# Patient Record
Sex: MALE | Race: WHITE | NOT HISPANIC OR LATINO | Employment: OTHER | URBAN - METROPOLITAN AREA
[De-identification: names, ages, dates, MRNs, and addresses within clinical notes are randomized per-mention and may not be internally consistent; named-entity substitution may affect disease eponyms.]

---

## 2020-08-01 ENCOUNTER — COMMUNICATION - HEALTHEAST (OUTPATIENT)
Dept: SCHEDULING | Facility: CLINIC | Age: 50
End: 2020-08-01

## 2021-05-26 ENCOUNTER — RECORDS - HEALTHEAST (OUTPATIENT)
Dept: ADMINISTRATIVE | Facility: CLINIC | Age: 51
End: 2021-05-26

## 2021-05-30 ENCOUNTER — HEALTH MAINTENANCE LETTER (OUTPATIENT)
Age: 51
End: 2021-05-30

## 2021-06-16 PROBLEM — R65.10 SIRS (SYSTEMIC INFLAMMATORY RESPONSE SYNDROME) (H): Status: ACTIVE | Noted: 2020-07-31

## 2021-09-19 ENCOUNTER — HEALTH MAINTENANCE LETTER (OUTPATIENT)
Age: 51
End: 2021-09-19

## 2021-10-21 ENCOUNTER — HOSPITAL ENCOUNTER (EMERGENCY)
Facility: CLINIC | Age: 51
Discharge: HOSPICE | End: 2021-10-22
Attending: FAMILY MEDICINE | Admitting: FAMILY MEDICINE
Payer: COMMERCIAL

## 2021-10-21 ENCOUNTER — APPOINTMENT (OUTPATIENT)
Dept: CT IMAGING | Facility: CLINIC | Age: 51
End: 2021-10-21
Payer: COMMERCIAL

## 2021-10-21 DIAGNOSIS — K83.09 CHOLANGITIS (H): ICD-10-CM

## 2021-10-21 DIAGNOSIS — K83.01 PSC (PRIMARY SCLEROSING CHOLANGITIS) (H): Chronic | ICD-10-CM

## 2021-10-21 DIAGNOSIS — K83.09 ASCENDING CHOLANGITIS (H): ICD-10-CM

## 2021-10-21 PROBLEM — R11.2 NAUSEA & VOMITING: Status: ACTIVE | Noted: 2020-01-07

## 2021-10-21 PROBLEM — R74.01 ELEVATED TRANSAMINASE LEVEL: Status: ACTIVE | Noted: 2020-01-07

## 2021-10-21 LAB
ALBUMIN SERPL-MCNC: 3.6 G/DL (ref 3.5–5)
ALP SERPL-CCNC: 381 U/L (ref 45–120)
ALT SERPL W P-5'-P-CCNC: 86 U/L (ref 0–45)
ANION GAP SERPL CALCULATED.3IONS-SCNC: 9 MMOL/L (ref 5–18)
APTT PPP: 31 SECONDS (ref 22–38)
AST SERPL W P-5'-P-CCNC: 70 U/L (ref 0–40)
BASOPHILS # BLD AUTO: 0.1 10E3/UL (ref 0–0.2)
BASOPHILS NFR BLD AUTO: 1 %
BILIRUB SERPL-MCNC: 1.6 MG/DL (ref 0–1)
BUN SERPL-MCNC: 10 MG/DL (ref 8–22)
CALCIUM SERPL-MCNC: 10.2 MG/DL (ref 8.5–10.5)
CHLORIDE BLD-SCNC: 101 MMOL/L (ref 98–107)
CO2 SERPL-SCNC: 27 MMOL/L (ref 22–31)
CREAT SERPL-MCNC: 1.03 MG/DL (ref 0.7–1.3)
EOSINOPHIL # BLD AUTO: 0.1 10E3/UL (ref 0–0.7)
EOSINOPHIL NFR BLD AUTO: 1 %
ERYTHROCYTE [DISTWIDTH] IN BLOOD BY AUTOMATED COUNT: 12.1 % (ref 10–15)
GFR SERPL CREATININE-BSD FRML MDRD: 84 ML/MIN/1.73M2
GLUCOSE BLD-MCNC: 105 MG/DL (ref 70–125)
HCT VFR BLD AUTO: 47.7 % (ref 40–53)
HGB BLD-MCNC: 15.3 G/DL (ref 13.3–17.7)
HOLD SPECIMEN: NORMAL
IMM GRANULOCYTES # BLD: 0 10E3/UL
IMM GRANULOCYTES NFR BLD: 0 %
INR PPP: 1 (ref 0.85–1.15)
LACTATE SERPL-SCNC: 1.3 MMOL/L (ref 0.7–2)
LIPASE SERPL-CCNC: 24 U/L (ref 0–52)
LYMPHOCYTES # BLD AUTO: 2.1 10E3/UL (ref 0.8–5.3)
LYMPHOCYTES NFR BLD AUTO: 17 %
MCH RBC QN AUTO: 28.9 PG (ref 26.5–33)
MCHC RBC AUTO-ENTMCNC: 32.1 G/DL (ref 31.5–36.5)
MCV RBC AUTO: 90 FL (ref 78–100)
MONOCYTES # BLD AUTO: 1 10E3/UL (ref 0–1.3)
MONOCYTES NFR BLD AUTO: 8 %
NEUTROPHILS # BLD AUTO: 9 10E3/UL (ref 1.6–8.3)
NEUTROPHILS NFR BLD AUTO: 73 %
NRBC # BLD AUTO: 0 10E3/UL
NRBC BLD AUTO-RTO: 0 /100
PLATELET # BLD AUTO: 398 10E3/UL (ref 150–450)
POTASSIUM BLD-SCNC: 4.1 MMOL/L (ref 3.5–5)
PROT SERPL-MCNC: 8.8 G/DL (ref 6–8)
RBC # BLD AUTO: 5.3 10E6/UL (ref 4.4–5.9)
SARS-COV-2 RNA RESP QL NAA+PROBE: NEGATIVE
SODIUM SERPL-SCNC: 137 MMOL/L (ref 136–145)
TROPONIN I SERPL-MCNC: 0.02 NG/ML (ref 0–0.29)
WBC # BLD AUTO: 12.3 10E3/UL (ref 4–11)

## 2021-10-21 PROCEDURE — 96375 TX/PRO/DX INJ NEW DRUG ADDON: CPT

## 2021-10-21 PROCEDURE — 85610 PROTHROMBIN TIME: CPT | Performed by: PHYSICIAN ASSISTANT

## 2021-10-21 PROCEDURE — 250N000011 HC RX IP 250 OP 636: Performed by: PHYSICIAN ASSISTANT

## 2021-10-21 PROCEDURE — 99285 EMERGENCY DEPT VISIT HI MDM: CPT | Mod: 25

## 2021-10-21 PROCEDURE — 83605 ASSAY OF LACTIC ACID: CPT | Performed by: PHYSICIAN ASSISTANT

## 2021-10-21 PROCEDURE — 74177 CT ABD & PELVIS W/CONTRAST: CPT

## 2021-10-21 PROCEDURE — 96376 TX/PRO/DX INJ SAME DRUG ADON: CPT

## 2021-10-21 PROCEDURE — 93005 ELECTROCARDIOGRAM TRACING: CPT | Performed by: EMERGENCY MEDICINE

## 2021-10-21 PROCEDURE — 82040 ASSAY OF SERUM ALBUMIN: CPT | Performed by: PHYSICIAN ASSISTANT

## 2021-10-21 PROCEDURE — 71275 CT ANGIOGRAPHY CHEST: CPT

## 2021-10-21 PROCEDURE — 96361 HYDRATE IV INFUSION ADD-ON: CPT

## 2021-10-21 PROCEDURE — C9803 HOPD COVID-19 SPEC COLLECT: HCPCS

## 2021-10-21 PROCEDURE — 85730 THROMBOPLASTIN TIME PARTIAL: CPT | Performed by: PHYSICIAN ASSISTANT

## 2021-10-21 PROCEDURE — 87635 SARS-COV-2 COVID-19 AMP PRB: CPT | Performed by: PHYSICIAN ASSISTANT

## 2021-10-21 PROCEDURE — 85004 AUTOMATED DIFF WBC COUNT: CPT | Performed by: PHYSICIAN ASSISTANT

## 2021-10-21 PROCEDURE — 87040 BLOOD CULTURE FOR BACTERIA: CPT | Mod: XS | Performed by: PHYSICIAN ASSISTANT

## 2021-10-21 PROCEDURE — 258N000003 HC RX IP 258 OP 636: Performed by: PHYSICIAN ASSISTANT

## 2021-10-21 PROCEDURE — 84484 ASSAY OF TROPONIN QUANT: CPT | Performed by: PHYSICIAN ASSISTANT

## 2021-10-21 PROCEDURE — 83690 ASSAY OF LIPASE: CPT | Performed by: PHYSICIAN ASSISTANT

## 2021-10-21 PROCEDURE — 96365 THER/PROPH/DIAG IV INF INIT: CPT | Mod: 59

## 2021-10-21 PROCEDURE — 36415 COLL VENOUS BLD VENIPUNCTURE: CPT | Performed by: PHYSICIAN ASSISTANT

## 2021-10-21 RX ORDER — IOPAMIDOL 755 MG/ML
100 INJECTION, SOLUTION INTRAVASCULAR ONCE
Status: COMPLETED | OUTPATIENT
Start: 2021-10-21 | End: 2021-10-21

## 2021-10-21 RX ORDER — IBUPROFEN 200 MG
600 TABLET ORAL EVERY 6 HOURS PRN
COMMUNITY

## 2021-10-21 RX ORDER — ONDANSETRON 2 MG/ML
4 INJECTION INTRAMUSCULAR; INTRAVENOUS ONCE
Status: COMPLETED | OUTPATIENT
Start: 2021-10-21 | End: 2021-10-21

## 2021-10-21 RX ORDER — PIPERACILLIN SODIUM, TAZOBACTAM SODIUM 3; .375 G/15ML; G/15ML
3.38 INJECTION, POWDER, LYOPHILIZED, FOR SOLUTION INTRAVENOUS EVERY 8 HOURS
Status: DISCONTINUED | OUTPATIENT
Start: 2021-10-22 | End: 2021-10-22 | Stop reason: HOSPADM

## 2021-10-21 RX ORDER — PIPERACILLIN SODIUM, TAZOBACTAM SODIUM 3; .375 G/15ML; G/15ML
3.38 INJECTION, POWDER, LYOPHILIZED, FOR SOLUTION INTRAVENOUS ONCE
Status: COMPLETED | OUTPATIENT
Start: 2021-10-21 | End: 2021-10-21

## 2021-10-21 RX ORDER — KETOROLAC TROMETHAMINE 30 MG/ML
30 INJECTION, SOLUTION INTRAMUSCULAR; INTRAVENOUS ONCE
Status: COMPLETED | OUTPATIENT
Start: 2021-10-21 | End: 2021-10-21

## 2021-10-21 RX ADMIN — KETOROLAC TROMETHAMINE 30 MG: 30 INJECTION, SOLUTION INTRAMUSCULAR; INTRAVENOUS at 21:49

## 2021-10-21 RX ADMIN — PIPERACILLIN AND TAZOBACTAM 3.38 G: 3; .375 INJECTION, POWDER, LYOPHILIZED, FOR SOLUTION INTRAVENOUS at 22:24

## 2021-10-21 RX ADMIN — ONDANSETRON 4 MG: 2 INJECTION INTRAMUSCULAR; INTRAVENOUS at 21:11

## 2021-10-21 RX ADMIN — HYDROMORPHONE HYDROCHLORIDE 1 MG: 1 INJECTION, SOLUTION INTRAMUSCULAR; INTRAVENOUS; SUBCUTANEOUS at 21:12

## 2021-10-21 RX ADMIN — IOPAMIDOL 100 ML: 755 INJECTION, SOLUTION INTRAVENOUS at 22:09

## 2021-10-21 RX ADMIN — SODIUM CHLORIDE 1000 ML: 9 INJECTION, SOLUTION INTRAVENOUS at 21:11

## 2021-10-21 ASSESSMENT — ENCOUNTER SYMPTOMS
CHILLS: 1
FATIGUE: 1
ABDOMINAL PAIN: 1
SHORTNESS OF BREATH: 1

## 2021-10-22 VITALS
OXYGEN SATURATION: 96 % | TEMPERATURE: 98.8 F | DIASTOLIC BLOOD PRESSURE: 72 MMHG | WEIGHT: 210 LBS | RESPIRATION RATE: 22 BRPM | SYSTOLIC BLOOD PRESSURE: 143 MMHG | HEART RATE: 87 BPM | BODY MASS INDEX: 26.96 KG/M2

## 2021-10-22 PROCEDURE — 250N000011 HC RX IP 250 OP 636: Performed by: FAMILY MEDICINE

## 2021-10-22 RX ORDER — HYDROMORPHONE HYDROCHLORIDE 1 MG/ML
0.5 INJECTION, SOLUTION INTRAMUSCULAR; INTRAVENOUS; SUBCUTANEOUS
Status: DISCONTINUED | OUTPATIENT
Start: 2021-10-22 | End: 2021-10-22 | Stop reason: HOSPADM

## 2021-10-22 RX ADMIN — HYDROMORPHONE HYDROCHLORIDE 0.5 MG: 1 INJECTION, SOLUTION INTRAMUSCULAR; INTRAVENOUS; SUBCUTANEOUS at 00:37

## 2021-10-22 RX ADMIN — HYDROMORPHONE HYDROCHLORIDE 0.5 MG: 1 INJECTION, SOLUTION INTRAMUSCULAR; INTRAVENOUS; SUBCUTANEOUS at 02:33

## 2021-10-22 NOTE — ED PROVIDER NOTES
ED CONSULTATION  Date/Time:10/21/2021 10:33 PM    I am seeing this patient along with KIMMY Hernandez.  I, Jeremie Mir M.D. have reviewed the documentation, personally taken the patient's history, performed an exam and agree with the physical finds, diagnosis and management plan.    DIAGNOSIS:  1. Cholangitis    2. Ascending cholangitis    3. PSC (primary sclerosing cholangitis)        10:34 PM Patient was staffed with me from Naveen Malone PA-C.  11:03 PM  Prior records were reviewed.  I personally performed history and physical.  I personally reviewed lab, EKG, and radiology results as indicated.  Care was discussed with mid-level provider.  Diagnosis and disposition were discussed.  Final disposition will be per the [facility] depending diagnostic studies and patient's clinical trajectory.  12:46 AM  Transfer to [facility].    New Prescriptions    No medications on file     HPI: 51-year-old male with history of primary sclerosing cholangitis, pulmonary embolism who comes in with abdominal pain and sensation of biliary obstruction that he has had in the past.  Physical Exam:/82   Pulse 92   Temp 100.3  F (37.9  C) (Oral)   Resp 22   Wt 95.3 kg (210 lb)   SpO2 94%   BMI 26.96 kg/m    Physical Exam  Vitals and nursing note reviewed.   Constitutional:       Appearance: Normal appearance.   HENT:      Head: Normocephalic and atraumatic.      Right Ear: External ear normal.      Left Ear: External ear normal.      Nose: Nose normal.   Eyes:      Extraocular Movements: Extraocular movements intact.      Conjunctiva/sclera: Conjunctivae normal.      Pupils: Pupils are equal, round, and reactive to light.   Pulmonary:      Effort: Pulmonary effort is normal.   Musculoskeletal:         General: No swelling or deformity. Normal range of motion.      Cervical back: Normal range of motion.   Neurological:      General: No focal deficit present.      Mental Status: He is alert and oriented to person, place, and  time. Mental status is at baseline.   Psychiatric:         Mood and Affect: Mood normal.         Behavior: Behavior normal.         Thought Content: Thought content normal.         Results for orders placed or performed during the hospital encounter of 10/21/21   CT Chest Pulmonary Embolism w Contrast    Impression    IMPRESSION:  1.  No etiology for symptoms evident. Negative CTA.   CT Abdomen Pelvis w Contrast    Impression    IMPRESSION:   1.  No inflammatory change, bowel obstruction or abscess.  2.  Mild retroperitoneal adenopathy unchanged.  3.  Mild biliary dilatation and extra hepatic bile duct thickening similar to prior.   Extra Blue Top Tube   Result Value Ref Range    Hold Specimen JIC    Extra Red Top Tube   Result Value Ref Range    Hold Specimen JIC    Extra Green Top (Lithium Heparin) Tube   Result Value Ref Range    Hold Specimen JIC    Extra Purple Top Tube   Result Value Ref Range    Hold Specimen JIC    Result Value Ref Range    INR 1.00 0.85 - 1.15   Partial thromboplastin time   Result Value Ref Range    aPTT 31 22 - 38 Seconds   Comprehensive metabolic panel   Result Value Ref Range    Sodium 137 136 - 145 mmol/L    Potassium 4.1 3.5 - 5.0 mmol/L    Chloride 101 98 - 107 mmol/L    Carbon Dioxide (CO2) 27 22 - 31 mmol/L    Anion Gap 9 5 - 18 mmol/L    Urea Nitrogen 10 8 - 22 mg/dL    Creatinine 1.03 0.70 - 1.30 mg/dL    Calcium 10.2 8.5 - 10.5 mg/dL    Glucose 105 70 - 125 mg/dL    Alkaline Phosphatase 381 (H) 45 - 120 U/L    AST 70 (H) 0 - 40 U/L    ALT 86 (H) 0 - 45 U/L    Protein Total 8.8 (H) 6.0 - 8.0 g/dL    Albumin 3.6 3.5 - 5.0 g/dL    Bilirubin Total 1.6 (H) 0.0 - 1.0 mg/dL    GFR Estimate 84 >60 mL/min/1.73m2   Lactic acid whole blood   Result Value Ref Range    Lactic Acid 1.3 0.7 - 2.0 mmol/L   Result Value Ref Range    Troponin I 0.02 0.00 - 0.29 ng/mL   Result Value Ref Range    Lipase 24 0 - 52 U/L   CBC with platelets and differential   Result Value Ref Range    WBC Count 12.3  (H) 4.0 - 11.0 10e3/uL    RBC Count 5.30 4.40 - 5.90 10e6/uL    Hemoglobin 15.3 13.3 - 17.7 g/dL    Hematocrit 47.7 40.0 - 53.0 %    MCV 90 78 - 100 fL    MCH 28.9 26.5 - 33.0 pg    MCHC 32.1 31.5 - 36.5 g/dL    RDW 12.1 10.0 - 15.0 %    Platelet Count 398 150 - 450 10e3/uL    % Neutrophils 73 %    % Lymphocytes 17 %    % Monocytes 8 %    % Eosinophils 1 %    % Basophils 1 %    % Immature Granulocytes 0 %    NRBCs per 100 WBC 0 <1 /100    Absolute Neutrophils 9.0 (H) 1.6 - 8.3 10e3/uL    Absolute Lymphocytes 2.1 0.8 - 5.3 10e3/uL    Absolute Monocytes 1.0 0.0 - 1.3 10e3/uL    Absolute Eosinophils 0.1 0.0 - 0.7 10e3/uL    Absolute Basophils 0.1 0.0 - 0.2 10e3/uL    Absolute Immature Granulocytes 0.0 <=0.0 10e3/uL    Absolute NRBCs 0.0 10e3/uL   Asymptomatic COVID-19 Virus (Coronavirus) by PCR Nasopharyngeal    Specimen: Nasopharyngeal; Swab   Result Value Ref Range    SARS CoV2 PCR Negative Negative       Labs Ordered and Resulted from Time of ED Arrival Up to the Time of Departure from the ED   COMPREHENSIVE METABOLIC PANEL - Abnormal; Notable for the following components:       Result Value    Alkaline Phosphatase 381 (*)     AST 70 (*)     ALT 86 (*)     Protein Total 8.8 (*)     Bilirubin Total 1.6 (*)     All other components within normal limits   CBC WITH PLATELETS AND DIFFERENTIAL - Abnormal; Notable for the following components:    WBC Count 12.3 (*)     Absolute Neutrophils 9.0 (*)     All other components within normal limits   INR - Normal   PARTIAL THROMBOPLASTIN TIME - Normal   LACTIC ACID WHOLE BLOOD - Normal   TROPONIN I - Normal   LIPASE - Normal   COVID-19 VIRUS (CORONAVIRUS) BY PCR - Normal    Narrative:     Testing was performed using the florin  SARS-CoV-2 & Influenza A/B Assay on the florin  [employer]  System.  This test should be ordered for the detection of SARS-COV-2 in individuals who meet SARS-CoV-2 clinical and/or epidemiological criteria. Test performance is unknown in asymptomatic patients.   This test is for in vitro diagnostic use under the FDA EUA for laboratories certified under CLIA to perform moderate and/or high complexity testing. This test has not been FDA cleared or approved.  A negative test does not rule out the presence of PCR inhibitors in the specimen or target RNA in concentration below the limit of detection for the assay. The possibility of a false negative should be considered if the patient's recent exposure or clinical presentation suggests COVID-19.  [facility] are certified under the Clinical Laboratory Improvement Amendments of 1988 (CLIA-88) as qualified to perform moderate and/or high complexity laboratory testing.   EXTRA BLUE TOP TUBE   EXTRA RED TOP TUBE   EXTRA GREEN TOP (LITHIUM HEPARIN) TUBE   EXTRA PURPLE TOP TUBE   MAY SALINE LOCK IV   CALL   BLOOD CULTURE   BLOOD CULTURE   EXTRA TUBE    Narrative:     The following orders were created for panel order Center Draw.  Procedure                               Abnormality         Status                     ---------                               -----------         ------                     Extra Blue Top Tube[[id number]]                              Final result               Extra Red Top Tube[[id number]]                               Final result               Extra Green Top (Lithium...[[id number]]                      Final result               Extra Purple Top Tube[[id number]]                            Final result                 Please view results for these tests on the individual orders.   CBC WITH PLATELETS & DIFFERENTIAL    Narrative:     The following orders were created for panel order CBC with platelets differential.  Procedure                               Abnormality         Status                     ---------                               -----------         ------                     CBC with platelets and d...[[id number]]  Abnormal            Final result                 Please view  results for these tests on the individual orders.              Jeremie Mir MD  10/22/21 0051

## 2021-10-22 NOTE — ED TRIAGE NOTES
"This morning left side chest pain began that radiates to shoulder. Also reports shortness of breath and chills this evening. Patient reports a history of primary sclerosing cholangitis and yesterday was having pain \"like something was sitting in my bile duct.\" Also history colon cancer.   "

## 2021-10-22 NOTE — PHARMACY-ADMISSION MEDICATION HISTORY
Pharmacy Note - Admission Medication History    Pertinent Provider Information: None   ______________________________________________________________________    Prior To Admission (PTA) med list completed and updated in EMR.       PTA Med List   Medication Sig Last Dose     ibuprofen (ADVIL/MOTRIN) 200 MG tablet Take 600 mg by mouth every 6 hours as needed for mild pain 10/21/2021 at x 1       Information source(s): Patient    Method of interview communication: in-person    Patient was asked about OTC/herbal products specifically.  PTA med list reflects this.    Based on the pharmacist's assessment, the PTA med list information appears reliable    Allergies were reviewed, assessed, and updated with the patient.      Patient does not use any multi-dose medications prior to admission.     Thank you for the opportunity to participate in the care of this patient.      Loki Quezada RPH     10/21/2021     9:06 PM

## 2021-10-22 NOTE — ED PROVIDER NOTES
EMERGENCY DEPARTMENT ENCOUNTER      NAME: Jonna Dumont  AGE: 51 year old male  YOB: 1970  MRN: [id number]  EVALUATION DATE & TIME: 10/21/2021  8:28 PM    PCP: No Ref-Primary, Physician    ED PROVIDER: Naveen Malone PA-C      Chief Complaint   Patient presents with     Chest Pain     Shortness of Breath         FINAL IMPRESSION:  1. Cholangitis    2. Ascending cholangitis    3. PSC (primary sclerosing cholangitis)          MEDICAL DECISION MAKING:    Pertinent Labs & Imaging studies reviewed. (See chart for details)  51 year old male presents to the Emergency Department for evaluation of abdominal pain, fever with history of primary sclerosing cholangitis.    After obtaining full history of present illness, reviewing vitals and examining the patient decided to image his chest and abdomen based on his previous history of pulmonary emboli and his chief complaints of abdominal pain, fever, and then pain radiating up into the chest.    Laboratory results did come back concerning for obstructive pathology with elevated alk phos as well as total bilirubin.  White blood cell count also elevated.  CT of the chest is negative for PE, no infectious findings on CT scan of the chest.  CT of the abdomen does show mild biliary dilatation and extrahepatic bile duct thickening but very similar compared to prior CT.  There is no inflammatory change or bowel obstruction or abscess noted.    At this point time patient has criteria for acute cholangitis with fever, belly pain, abnormal LFTs.  He has been treated with IV fluids, pain medication, nausea medicine, IV Zosyn and blood cultures.  Patient remained stable.  We did attempt to look for a bed admitting the patient to [facility] which was his first request, they are closed.  We will attempt the [facility] next.    I did discuss the case with our on-call gastroenterologist and he does agree with the management at this time and does endorse that the patient will  likely need an ERCP sometime in the near future.  Unfortunately her woodwinds we cannot perform ERCPs.    Discuss transfer with the patient down to [facility], he agrees.  We will transfer the patient by ambulance down to this facility.    ED COURSE    8:38 PM I met with the patient, obtained history, performed an initial exam, and discussed options and plan for diagnostics and treatment here in the ED.  10:39 PM Staffed patient with Dr. Jeremie Mir. Spoke with him about the patient's history and plan for diagnostics and treatment.   10:48 PM Spoke with Dr. Banda of [facility], GI.   11:04 PM Spoke with Hospital intake line of [facility]. They will be discussing the case and looking to see if they have an appropriate bed.  12:46 AM Spoke with [place] bed placement about transfer to [facility] through the [facility], Dr. Brennan will be accepting hospitalist.    At the conclusion of the encounter I discussed the results of all of the tests and the disposition. The questions were answered. The patient or family acknowledged understanding and was agreeable with the care plan.     MEDICATIONS GIVEN IN THE EMERGENCY:  Medications   piperacillin-tazobactam (ZOSYN) 3.375 g vial to attach to  mL bag (has no administration in time range)   HYDROmorphone (PF) (DILAUDID) injection 0.5 mg (0.5 mg Intravenous Given 10/22/21 0037)   0.9% sodium chloride BOLUS (1,000 mLs Intravenous New Bag 10/21/21 2111)   HYDROmorphone (DILAUDID) injection 1 mg (1 mg Intravenous Given 10/21/21 2112)   ondansetron (ZOFRAN) injection 4 mg (4 mg Intravenous Given 10/21/21 2111)   ketorolac (TORADOL) injection 30 mg (30 mg Intravenous Given 10/21/21 2149)   piperacillin-tazobactam (ZOSYN) 3.375 g vial to attach to  mL bag (0 g Intravenous Stopped 10/21/21 2254)   iopamidol (ISOVUE-370) solution 100 mL (100 mLs Intravenous Given 10/21/21 2209)       NEW PRESCRIPTIONS STARTED AT TODAY'S ER VISIT  New  Prescriptions    No medications on file            =================================================================    HPI    Patient information was obtained from: patient and patient's wife    Use of Interpretor: N/A       Jonna Dumont is a 51 year old male with a pertinent history of primary sclerosing cholangitis, ulcerative colitis, pulmonary embolism, colon cancer, and Factor V Leiden who presents to this ED by walk in for evaluation of abdominal pain.    Per chart review, on 2/12/2021, patient was seen at [facility] for recent onset of mild abdominal pain and pruritis and fever. CT abdomen demonstrated what appeared to be chronic changes with no acute findings to explain his right upper quadrant pain.  Had mild leukocytosis and was treated with oral antibiotics (cefuroxime/metronidazole) and sent home. Persistent symptoms prompted him to present to [facility] on 2/13/2021 where right upper quadrant ultrasound showed no acute findings, no evidence of cholecystitis or cholangitis on imaging. Given his fever, leukocytosis and worsening pain, he was empirically treated with Zosyn for presumed descending cholangitis. He was transferred to [facility] for further assessment. Zosyn was continued and GI and Hepatobiliary surgery consulted. He improved and was transitioned to oral antibiotics. He is able to eat and drink without difficulty. He had a follow up scheduled with [facility] on 2/23/2021 for routine surveillance MRCP.    Yesterday, patient felt like there was something sitting in his common bile duct. This morning, patient felt lower left quadrant abdominal pain which is odd because he usually feels abdominal pain on the right upper quadrant. Reports that pain radiates up his left side and into his shoulder and chest with some associated shortness of breath. At 12:00 PM (~9 hours ago) patient took Ibuprofen which wore off around 5:00 PM (~4 hours ago). At this point, the pain  "returned as well as onset of chills. Patient took a nap by his fireplace and a very hot shower, but chills persisted. Also notes that his hands were cold all day. After getting dressed following his shower, patient reports feeling very fatigued. At this point, he decided to present to the ED. Patient reports he wanted to go to [facility] where he was admitted last time, however, the pain was too intense to drive that far to be seen.     Patient reports he was previously on an anticoagulant for a long time for his history of PE and Factor V Leiden, however, is no longer taking it per the recommendation of a specialist at [facility]. Notes that current shortness of breath does not feel as bad as when he had his pulmonary embolus. Patient is unsure if he has had any abnormal stool since he has a J pouch and reports stool is never \"normal\". He reports having his last ERCP over a year ago. Patient is fully vaccinated for COVID-19. He denies any recent known COVID-19 exposures. Patient denies any other complaints at this time.      REVIEW OF SYSTEMS   Review of Systems   Constitutional: Positive for chills and fatigue.        Positive for cold hands.   Respiratory: Positive for shortness of breath.    Cardiovascular: Positive for chest pain (left side).   Gastrointestinal: Positive for abdominal pain (LLQ).   Musculoskeletal:        Positive for left shoulder and left side pain.   All other systems reviewed and are negative.         PAST MEDICAL HISTORY:  Past Medical History:   Diagnosis Date     Colon cancer (H)      Factor V Leiden mutation (H)      Pulmonary embolism (H)      Ulcerative colitis        PAST SURGICAL HISTORY:  Past Surgical History:   Procedure Laterality Date     BACK SURGERY      lumbar discectomy     COLON SURGERY       ENDOSCOPIC RETROGRADE CHOLANGIOPANCREATOGRAM  5/23/2016    Procedure: ENDOSCOPIC RETROGRADE CHOLANGIOPANCREATOGRAPHY WITH BALOON DILATION BILE DUCT STRICTURE WITH BILIARY " BRUSHINGS WITH SLUDGE REMOVAL;  Surgeon: Michael Jiménez MD;  Location: [facility] OR;  Service:      PROCTOSCOPY  5/3/2011    Procedure:PROCTOSCOPY; Rectal Exam Under Anesthesia , with Smith scope, Anoscopy, Pouchoscopy with biopsies Flexible sigmoidoscopy with biopsies; Surgeon:SAMEER LOPEZ; Location:[facility] OR     SIGMOIDOSCOPY FLEXIBLE  5/3/2011    Procedure:SIGMOIDOSCOPY FLEXIBLE; for Pouchoscopy with biopsies ; Surgeon:SAMEER LOPEZ; Location:[facility] OR         CURRENT MEDICATIONS:      Current Facility-Administered Medications:      HYDROmorphone (PF) (DILAUDID) injection 0.5 mg, 0.5 mg, Intravenous, Q2H PRN, Jeremie Mir MD, 0.5 mg at 10/22/21 0037     piperacillin-tazobactam (ZOSYN) 3.375 g vial to attach to  mL bag, 3.375 g, Intravenous, Q8H, Naveen Malone PA-C    Current Outpatient Medications:      ibuprofen (ADVIL/MOTRIN) 200 MG tablet, Take 600 mg by mouth every 6 hours as needed for mild pain, Disp: , Rfl:       ALLERGIES:  Allergies   Allergen Reactions     Azithromycin Shortness Of Breath       FAMILY HISTORY:  No family history on file.    SOCIAL HISTORY:   Social History     Socioeconomic History     Marital status:      Spouse name: Not on file     Number of children: Not on file     Years of education: Not on file     Highest education level: Not on file   Occupational History     Not on file   Tobacco Use     Smoking status: Current Every Day Smoker     Packs/day: 0.25     Years: 4.00     Pack years: 1.00     Types: Cigarettes, Cigarettes   Substance and Sexual Activity     Alcohol use: No     Comment: rare     Drug use: No     Sexual activity: Not on file   Other Topics Concern     Parent/sibling w/ CABG, MI or angioplasty before 65F 55M? Not Asked   Social History Narrative     Not on file     Social Determinants of Health     Financial Resource Strain:      Difficulty of Paying Living Expenses:    Food Insecurity:      Worried About Running Out of Food in the  Last Year:      Ran Out of Food in the Last Year:    Transportation Needs:      Lack of Transportation (Medical):      Lack of Transportation (Non-Medical):    Physical Activity:      Days of Exercise per Week:      Minutes of Exercise per Session:    Stress:      Feeling of Stress :    Social Connections:      Frequency of Communication with Friends and Family:      Frequency of Social Gatherings with Friends and Family:      Attends Jewish Services:      Active Member of Clubs or Organizations:      Attends Club or Organization Meetings:      Marital Status:    Intimate Partner Violence:      Fear of Current or Ex-Partner:      Emotionally Abused:      Physically Abused:      Sexually Abused:        VITALS:  Patient Vitals for the past 24 hrs:   BP Temp Temp src Pulse Resp SpO2 Weight   10/22/21 0030 115/82 -- -- 92 22 94 % --   10/22/21 0000 124/62 -- -- 93 17 92 % --   10/21/21 2330 117/69 -- -- 98 15 93 % --   10/21/21 2300 (!) 146/75 -- -- 107 20 93 % --   10/21/21 2245 -- 100.3  F (37.9  C) Oral -- -- -- --   10/21/21 2129 -- -- -- 102 25 95 % --   10/21/21 2026 132/74 (!) 101.7  F (38.7  C) Oral 120 20 97 % 95.3 kg (210 lb)       PHYSICAL EXAM    Physical Exam  Vitals and nursing note reviewed.   Constitutional:       General: He is not in acute distress.     Appearance: Normal appearance. He is not ill-appearing or toxic-appearing.   HENT:      Head: Normocephalic and atraumatic.      Right Ear: External ear normal.      Left Ear: Tympanic membrane and external ear normal.      Nose: Nose normal.      Mouth/Throat:      Pharynx: No posterior oropharyngeal erythema.   Eyes:      General: No scleral icterus.        Right eye: No discharge.         Left eye: No discharge.      Extraocular Movements: Extraocular movements intact.      Conjunctiva/sclera: Conjunctivae normal.   Cardiovascular:      Rate and Rhythm: Normal rate and regular rhythm.      Pulses: Normal pulses.      Heart sounds: Normal heart  sounds. No murmur heard.     Pulmonary:      Effort: Pulmonary effort is normal. No respiratory distress.      Breath sounds: Normal breath sounds.   Abdominal:      General: Abdomen is flat. Bowel sounds are normal.      Comments: Patient has reproducible tenderness to touch primarily in the left side of the abdomen.  No obvious distention.   Musculoskeletal:         General: No swelling, tenderness, deformity or signs of injury. Normal range of motion.      Cervical back: Normal range of motion and neck supple.   Skin:     General: Skin is warm and dry.      Coloration: Skin is not jaundiced.      Findings: No bruising, erythema or rash.   Neurological:      General: No focal deficit present.      Mental Status: He is alert and oriented to person, place, and time. Mental status is at baseline.      Sensory: No sensory deficit.      Motor: No weakness.   Psychiatric:         Mood and Affect: Mood normal.         Behavior: Behavior normal.         Judgment: Judgment normal.          LAB:  All pertinent labs reviewed and interpreted.  Results for orders placed or performed during the hospital encounter of 10/21/21   CT Chest Pulmonary Embolism w Contrast    Impression    IMPRESSION:  1.  No etiology for symptoms evident. Negative CTA.   CT Abdomen Pelvis w Contrast    Impression    IMPRESSION:   1.  No inflammatory change, bowel obstruction or abscess.  2.  Mild retroperitoneal adenopathy unchanged.  3.  Mild biliary dilatation and extra hepatic bile duct thickening similar to prior.   Extra Blue Top Tube   Result Value Ref Range    Hold Specimen JIC    Extra Red Top Tube   Result Value Ref Range    Hold Specimen JIC    Extra Green Top (Lithium Heparin) Tube   Result Value Ref Range    Hold Specimen JIC    Extra Purple Top Tube   Result Value Ref Range    Hold Specimen JIC    Result Value Ref Range    INR 1.00 0.85 - 1.15   Partial thromboplastin time   Result Value Ref Range    aPTT 31 22 - 38 Seconds    Comprehensive metabolic panel   Result Value Ref Range    Sodium 137 136 - 145 mmol/L    Potassium 4.1 3.5 - 5.0 mmol/L    Chloride 101 98 - 107 mmol/L    Carbon Dioxide (CO2) 27 22 - 31 mmol/L    Anion Gap 9 5 - 18 mmol/L    Urea Nitrogen 10 8 - 22 mg/dL    Creatinine 1.03 0.70 - 1.30 mg/dL    Calcium 10.2 8.5 - 10.5 mg/dL    Glucose 105 70 - 125 mg/dL    Alkaline Phosphatase 381 (H) 45 - 120 U/L    AST 70 (H) 0 - 40 U/L    ALT 86 (H) 0 - 45 U/L    Protein Total 8.8 (H) 6.0 - 8.0 g/dL    Albumin 3.6 3.5 - 5.0 g/dL    Bilirubin Total 1.6 (H) 0.0 - 1.0 mg/dL    GFR Estimate 84 >60 mL/min/1.73m2   Lactic acid whole blood   Result Value Ref Range    Lactic Acid 1.3 0.7 - 2.0 mmol/L   Result Value Ref Range    Troponin I 0.02 0.00 - 0.29 ng/mL   Result Value Ref Range    Lipase 24 0 - 52 U/L   CBC with platelets and differential   Result Value Ref Range    WBC Count 12.3 (H) 4.0 - 11.0 10e3/uL    RBC Count 5.30 4.40 - 5.90 10e6/uL    Hemoglobin 15.3 13.3 - 17.7 g/dL    Hematocrit 47.7 40.0 - 53.0 %    MCV 90 78 - 100 fL    MCH 28.9 26.5 - 33.0 pg    MCHC 32.1 31.5 - 36.5 g/dL    RDW 12.1 10.0 - 15.0 %    Platelet Count 398 150 - 450 10e3/uL    % Neutrophils 73 %    % Lymphocytes 17 %    % Monocytes 8 %    % Eosinophils 1 %    % Basophils 1 %    % Immature Granulocytes 0 %    NRBCs per 100 WBC 0 <1 /100    Absolute Neutrophils 9.0 (H) 1.6 - 8.3 10e3/uL    Absolute Lymphocytes 2.1 0.8 - 5.3 10e3/uL    Absolute Monocytes 1.0 0.0 - 1.3 10e3/uL    Absolute Eosinophils 0.1 0.0 - 0.7 10e3/uL    Absolute Basophils 0.1 0.0 - 0.2 10e3/uL    Absolute Immature Granulocytes 0.0 <=0.0 10e3/uL    Absolute NRBCs 0.0 10e3/uL   Asymptomatic COVID-19 Virus (Coronavirus) by PCR Nasopharyngeal    Specimen: Nasopharyngeal; Swab   Result Value Ref Range    SARS CoV2 PCR Negative Negative       RADIOLOGY:  Reviewed all pertinent imaging. Please see official radiology report.  CT Abdomen Pelvis w Contrast   Final Result   IMPRESSION:    1.   No inflammatory change, bowel obstruction or abscess.   2.  Mild retroperitoneal adenopathy unchanged.   3.  Mild biliary dilatation and extra hepatic bile duct thickening similar to prior.      CT Chest Pulmonary Embolism w Contrast   Final Result   IMPRESSION:   1.  No etiology for symptoms evident. Negative CTA.          EKG:    Performed at: 2035    The EKG showing sinus tachycardia at a ventricular rate of 107 bpm.  ST segments appear grossly normal no obvious elevation or depression.  T waves are upright.  QTC measured at 456.  Compared to previous EKG from July 2020, no significant change identified.    I have independently reviewed and interpreted the EKG(s) documented above.  Reviewed with Dr. Mir          I, Nora Goodwin, am serving as a scribe to document services personally performed by Naveen Malone PA-C based on my observation and the provider's statements to me. I, Naveen Malone PA-C attest that Nora Goodwin is acting in a scribe capacity, has observed my performance of the services and has documented them in accordance with my direction.    Naveen Malone PA-C  Emergency Medicine  [facility]     Naveen Malone PA-C  10/22/21 0053

## 2021-10-27 LAB
BACTERIA BLD CULT: NO GROWTH
BACTERIA BLD CULT: NO GROWTH

## 2021-12-12 LAB
ATRIAL RATE - MUSE: 107 BPM
DIASTOLIC BLOOD PRESSURE - MUSE: NORMAL MMHG
INTERPRETATION ECG - MUSE: NORMAL
P AXIS - MUSE: 51 DEGREES
PR INTERVAL - MUSE: 112 MS
QRS DURATION - MUSE: 108 MS
QT - MUSE: 342 MS
QTC - MUSE: 456 MS
R AXIS - MUSE: 70 DEGREES
SYSTOLIC BLOOD PRESSURE - MUSE: NORMAL MMHG
T AXIS - MUSE: 46 DEGREES
VENTRICULAR RATE- MUSE: 107 BPM

## 2022-01-13 ENCOUNTER — APPOINTMENT (OUTPATIENT)
Dept: CT IMAGING | Facility: CLINIC | Age: 52
End: 2022-01-13
Attending: EMERGENCY MEDICINE
Payer: COMMERCIAL

## 2022-01-13 ENCOUNTER — HOSPITAL ENCOUNTER (EMERGENCY)
Facility: CLINIC | Age: 52
Discharge: HOME | End: 2022-01-14
Attending: EMERGENCY MEDICINE | Admitting: EMERGENCY MEDICINE
Payer: COMMERCIAL

## 2022-01-13 DIAGNOSIS — K83.01 PSC (PRIMARY SCLEROSING CHOLANGITIS) (H): ICD-10-CM

## 2022-01-13 DIAGNOSIS — R10.13 EPIGASTRIC PAIN: ICD-10-CM

## 2022-01-13 DIAGNOSIS — U07.1 INFECTION DUE TO 2019 NOVEL CORONAVIRUS: ICD-10-CM

## 2022-01-13 LAB
ALBUMIN SERPL-MCNC: 3.5 G/DL (ref 3.5–5)
ALP SERPL-CCNC: 170 U/L (ref 45–120)
ALT SERPL W P-5'-P-CCNC: 34 U/L (ref 0–45)
ANION GAP SERPL CALCULATED.3IONS-SCNC: 10 MMOL/L (ref 5–18)
AST SERPL W P-5'-P-CCNC: 27 U/L (ref 0–40)
BASOPHILS # BLD AUTO: 0.1 10E3/UL (ref 0–0.2)
BASOPHILS NFR BLD AUTO: 1 %
BILIRUB SERPL-MCNC: 0.5 MG/DL (ref 0–1)
BUN SERPL-MCNC: 7 MG/DL (ref 8–22)
CALCIUM SERPL-MCNC: 9.8 MG/DL (ref 8.5–10.5)
CHLORIDE BLD-SCNC: 104 MMOL/L (ref 98–107)
CO2 SERPL-SCNC: 25 MMOL/L (ref 22–31)
CREAT SERPL-MCNC: 0.82 MG/DL (ref 0.7–1.3)
EOSINOPHIL # BLD AUTO: 0.1 10E3/UL (ref 0–0.7)
EOSINOPHIL NFR BLD AUTO: 1 %
ERYTHROCYTE [DISTWIDTH] IN BLOOD BY AUTOMATED COUNT: 12 % (ref 10–15)
GFR SERPL CREATININE-BSD FRML MDRD: >90 ML/MIN/1.73M2
GLUCOSE BLD-MCNC: 134 MG/DL (ref 70–125)
HCT VFR BLD AUTO: 46.9 % (ref 40–53)
HGB BLD-MCNC: 15.6 G/DL (ref 13.3–17.7)
IMM GRANULOCYTES # BLD: 0 10E3/UL
IMM GRANULOCYTES NFR BLD: 0 %
LIPASE SERPL-CCNC: 30 U/L (ref 0–52)
LYMPHOCYTES # BLD AUTO: 3.8 10E3/UL (ref 0.8–5.3)
LYMPHOCYTES NFR BLD AUTO: 28 %
MCH RBC QN AUTO: 28.5 PG (ref 26.5–33)
MCHC RBC AUTO-ENTMCNC: 33.3 G/DL (ref 31.5–36.5)
MCV RBC AUTO: 86 FL (ref 78–100)
MONOCYTES # BLD AUTO: 1.3 10E3/UL (ref 0–1.3)
MONOCYTES NFR BLD AUTO: 10 %
NEUTROPHILS # BLD AUTO: 8.3 10E3/UL (ref 1.6–8.3)
NEUTROPHILS NFR BLD AUTO: 60 %
NRBC # BLD AUTO: 0 10E3/UL
NRBC BLD AUTO-RTO: 0 /100
PLATELET # BLD AUTO: 477 10E3/UL (ref 150–450)
POTASSIUM BLD-SCNC: 3.7 MMOL/L (ref 3.5–5)
PROT SERPL-MCNC: 8.9 G/DL (ref 6–8)
RBC # BLD AUTO: 5.47 10E6/UL (ref 4.4–5.9)
SODIUM SERPL-SCNC: 139 MMOL/L (ref 136–145)
WBC # BLD AUTO: 13.6 10E3/UL (ref 4–11)

## 2022-01-13 PROCEDURE — 36415 COLL VENOUS BLD VENIPUNCTURE: CPT | Performed by: EMERGENCY MEDICINE

## 2022-01-13 PROCEDURE — 83690 ASSAY OF LIPASE: CPT | Performed by: EMERGENCY MEDICINE

## 2022-01-13 PROCEDURE — 99285 EMERGENCY DEPT VISIT HI MDM: CPT | Mod: 25

## 2022-01-13 PROCEDURE — C9803 HOPD COVID-19 SPEC COLLECT: HCPCS

## 2022-01-13 PROCEDURE — 85025 COMPLETE CBC W/AUTO DIFF WBC: CPT | Performed by: EMERGENCY MEDICINE

## 2022-01-13 PROCEDURE — 96374 THER/PROPH/DIAG INJ IV PUSH: CPT | Mod: 59

## 2022-01-13 PROCEDURE — 80053 COMPREHEN METABOLIC PANEL: CPT | Performed by: EMERGENCY MEDICINE

## 2022-01-13 PROCEDURE — 87636 SARSCOV2 & INF A&B AMP PRB: CPT | Performed by: EMERGENCY MEDICINE

## 2022-01-13 PROCEDURE — 74177 CT ABD & PELVIS W/CONTRAST: CPT

## 2022-01-13 PROCEDURE — 250N000011 HC RX IP 250 OP 636: Performed by: EMERGENCY MEDICINE

## 2022-01-13 PROCEDURE — 84484 ASSAY OF TROPONIN QUANT: CPT | Performed by: EMERGENCY MEDICINE

## 2022-01-13 RX ORDER — IOPAMIDOL 755 MG/ML
100 INJECTION, SOLUTION INTRAVASCULAR ONCE
Status: COMPLETED | OUTPATIENT
Start: 2022-01-14 | End: 2022-01-13

## 2022-01-13 RX ADMIN — IOPAMIDOL 100 ML: 755 INJECTION, SOLUTION INTRAVENOUS at 23:51

## 2022-01-14 VITALS
HEART RATE: 86 BPM | TEMPERATURE: 99.6 F | RESPIRATION RATE: 18 BRPM | OXYGEN SATURATION: 96 % | DIASTOLIC BLOOD PRESSURE: 85 MMHG | SYSTOLIC BLOOD PRESSURE: 133 MMHG | WEIGHT: 222 LBS | BODY MASS INDEX: 28.5 KG/M2

## 2022-01-14 LAB
ATRIAL RATE - MUSE: 106 BPM
DIASTOLIC BLOOD PRESSURE - MUSE: 86 MMHG
FLUAV RNA SPEC QL NAA+PROBE: NEGATIVE
FLUBV RNA RESP QL NAA+PROBE: NEGATIVE
HOLD SPECIMEN: NORMAL
INTERPRETATION ECG - MUSE: NORMAL
P AXIS - MUSE: 42 DEGREES
PR INTERVAL - MUSE: 126 MS
QRS DURATION - MUSE: 136 MS
QT - MUSE: 370 MS
QTC - MUSE: 491 MS
R AXIS - MUSE: 46 DEGREES
SARS-COV-2 RNA RESP QL NAA+PROBE: POSITIVE
SYSTOLIC BLOOD PRESSURE - MUSE: 146 MMHG
T AXIS - MUSE: 25 DEGREES
TROPONIN I SERPL-MCNC: <0.01 NG/ML (ref 0–0.29)
VENTRICULAR RATE- MUSE: 106 BPM

## 2022-01-14 PROCEDURE — 250N000011 HC RX IP 250 OP 636: Performed by: EMERGENCY MEDICINE

## 2022-01-14 PROCEDURE — 93005 ELECTROCARDIOGRAM TRACING: CPT | Performed by: EMERGENCY MEDICINE

## 2022-01-14 RX ORDER — KETOROLAC TROMETHAMINE 15 MG/ML
15 INJECTION, SOLUTION INTRAMUSCULAR; INTRAVENOUS ONCE
Status: COMPLETED | OUTPATIENT
Start: 2022-01-14 | End: 2022-01-14

## 2022-01-14 RX ADMIN — KETOROLAC TROMETHAMINE 15 MG: 15 INJECTION, SOLUTION INTRAMUSCULAR; INTRAVENOUS at 01:01

## 2022-01-14 NOTE — ED TRIAGE NOTES
Patient here with elevated temperature, SOB, chest pain and common bile duct pain, he had a negative Covid test yesterday and is awaiting the results of a PCR that was done today, he reports that his WBC that was drawn earlier today was 12.1.  Samantha Fraser RN.......1/13/2022 11:04 PM

## 2022-01-14 NOTE — DISCHARGE INSTRUCTIONS
There is an area on your CT in your left hepatic lobe that we will need follow-up. Follow-up with your gastroenterologist for likely MRI in the future.

## 2022-01-14 NOTE — ED PROVIDER NOTES
EMERGENCY DEPARTMENT ENCOUNTER      NAME: Jonna Dumont  AGE: 51 year old male  YOB: 1970  MRN: [id number]  EVALUATION DATE & TIME: 2022 10:55 PM    PCP: System, Provider Not In    ED PROVIDER: Michael Saunders D.O.      Chief Complaint   Patient presents with     Chest Pain     Suspected Covid       FINAL IMPRESSION:  1. Epigastric pain        ED COURSE & MEDICAL DECISION MAKIN:07 PM I met with the patient to gather history and to perform my initial exam. I discussed the plan for care while in the Emergency Department.    ED Course as of 22 1413      0057 CT Abdomen Pelvis w Contrast       Pertinent Labs & Imaging studies reviewed. (See chart for details)  51 year old male presents to the Emergency Department for evaluation of epigastric pain.  Patient has known history of PSC, but also recently was diagnosed with COVID.  Does report recent negative test, but also reports symptoms concerning for the same.  Lab testing does show an elevated white count, with normal LFTs.  CT scan the abdomen is pending as well as repeat COVID testing.  It is likely that his symptoms are related to the COVID, however with his history we will ensure that there is no other acute process by CT scan.  Patient care will be turned over to Dr. Simon for final disposition, though I do anticipate patient will be discharged home.    At the conclusion of the encounter I discussed the results of all of the tests and the disposition. The questions were answered. The patient or family acknowledged understanding and was agreeable with the care plan.      HPI    Patient information was obtained from: Patient     Use of : N/A        Jonna Dumont is a 51 year old male with a pertinent history of ulcerative colitis, colon cancer, PSC, who presents via walk-in for evaluation of abdominal pain, cough, fever.    Patient reports he has been recovering from COVID-19 recently and has continued to  have a cough and fever despite testing negative. Patient also had onset of worsening epigastric pain a few days ago which he states is consistent with his PSC. He was seen by his PCP earlier today and was referred to this ED after his WBC was elevated and he measured a fever. Patient notes that he did have ERCP done for his PSC at [place] in October of last year.    Patient denies any other complaints.     Patient is a nonsmoker and nondrinker.      REVIEW OF SYSTEMS  Constitutional: Positive for fever. Denies weight loss or weakness  Eyes:  No pain, discharge, redness  HENT:  Denies sore throat, ear pain, congestion  Respiratory: Positive for cough. No SOB, wheeze  Cardiovascular:  No CP, palpitations  GI: Positive for epigastric abdominal pain Denies nausea, vomiting, diarrhea  : Denies dysuria, hematuria  Musculoskeletal:  Denies any new muscle/joint pain, swelling or loss of function.  Skin:  Denies rash, pallor  Neurologic:  Denies headache, focal weakness or sensory changes  Lymph: Denies swollen nodes    All other systems negative unless noted in HPI.    PAST MEDICAL HISTORY:  Past Medical History:   Diagnosis Date     Colon cancer (H)      Factor V Leiden mutation (H)      Pulmonary embolism (H)      Ulcerative colitis        PAST SURGICAL HISTORY:  Past Surgical History:   Procedure Laterality Date     BACK SURGERY      lumbar discectomy     COLON SURGERY       ENDOSCOPIC RETROGRADE CHOLANGIOPANCREATOGRAM  5/23/2016    Procedure: ENDOSCOPIC RETROGRADE CHOLANGIOPANCREATOGRAPHY WITH BALOON DILATION BILE DUCT STRICTURE WITH BILIARY BRUSHINGS WITH SLUDGE REMOVAL;  Surgeon: Michael Jiménez MD;  Location: [facility];  Service:      PROCTOSCOPY  5/3/2011    Procedure:PROCTOSCOPY; Rectal Exam Under Anesthesia , with Smith scope, Anoscopy, Pouchoscopy with biopsies Flexible sigmoidoscopy with biopsies; Surgeon:SAMEER LOPEZ; Location: OR     SIGMOIDOSCOPY FLEXIBLE  5/3/2011     Procedure:SIGMOIDOSCOPY FLEXIBLE; for Pouchoscopy with biopsies ; Surgeon:SAMEER LOPEZ; Location:[facility] OR         CURRENT MEDICATIONS:    Current Facility-Administered Medications   Medication     [START ON 1/14/2022] iopamidol (ISOVUE-370) solution 100 mL     Current Outpatient Medications   Medication     ibuprofen (ADVIL/MOTRIN) 200 MG tablet         ALLERGIES:  Allergies   Allergen Reactions     Azithromycin Shortness Of Breath       FAMILY HISTORY:  No family history on file.    SOCIAL HISTORY:  Social History     Socioeconomic History     Marital status:      Spouse name: Not on file     Number of children: Not on file     Years of education: Not on file     Highest education level: Not on file   Occupational History     Not on file   Tobacco Use     Smoking status: Current Every Day Smoker     Packs/day: 0.25     Years: 4.00     Pack years: 1.00     Types: Cigarettes, Cigarettes     Smokeless tobacco: Not on file   Substance and Sexual Activity     Alcohol use: No     Comment: rare     Drug use: No     Sexual activity: Not on file   Other Topics Concern     Parent/sibling w/ CABG, MI or angioplasty before 65F 55M? Not Asked   Social History Narrative     Not on file     Social Determinants of Health     Financial Resource Strain: Not on file   Food Insecurity: Not on file   Transportation Needs: Not on file   Physical Activity: Not on file   Stress: Not on file   Social Connections: Not on file   Intimate Partner Violence: Not on file   Housing Stability: Not on file       VITALS:  Patient Vitals for the past 24 hrs:   BP Temp Temp src Pulse Resp SpO2 Weight   01/13/22 2304 (!) 146/86 99.6  F (37.6  C) Oral 114 18 97 % 100.7 kg (222 lb)   01/13/22 2300 (!) 146/86 -- -- 112 (!) 55 97 % --       PHYSICAL EXAM    VITAL SIGNS: BP (!) 146/86   Pulse 114   Temp 99.6  F (37.6  C) (Oral)   Resp 18   Wt 100.7 kg (222 lb)   SpO2 97%   BMI 28.50 kg/m      General Appearance: Well-appearing, well-nourished,  no acute distress   Head:  Normocephalic, without obvious abnormality, atraumatic  Eyes:  PERRL, conjunctiva/corneas clear, EOM's intact,  ENT:  Lips, mucosa, and tongue normal, membranes are moist without pallor  Neck:  Normal ROM, symmetrical, trachea midline    Cardio:  Regular rate and rhythm, no murmur, rub or gallop, 2+ pulses symmetric in all extremities  Pulm:  Clear to auscultation bilaterally, respirations unlabored,  Abdomen: Minimal epigastric tenderness, soft, no rebound or guarding.  Musculoskeletal: Full ROM, no edema, no cyanosis, good ROM of major joints  Integument:  Warm, Dry, No erythema, No rash.    Neurologic:  Alert & oriented.  No focal deficits appreciated.  Ambulatory.  Psychiatric:  Affect normal, Judgment normal, Mood normal.      LABS  Results for orders placed or performed during the hospital encounter of 01/13/22 (from the past 24 hour(s))   [place] Draw    Narrative    The following orders were created for panel order [place] Draw.  Procedure                               Abnormality         Status                     ---------                               -----------         ------                     Extra Blue Top Tube[[id number]]                              In process                 Extra Red Top Tube[[id number]]                               In process                 Extra Green Top (Lithium...[[id number]]                      In process                 Extra Purple Top Tube[[id number]]                            In process                 Extra Green Top (Lithium...[[id number]]                      In process                   Please view results for these tests on the individual orders.   CBC with platelets + differential    Narrative    The following orders were created for panel order CBC with platelets + differential.  Procedure                               Abnormality         Status                     ---------                               -----------         ------                      CBC with platelets and d...[[id number]]  Abnormal            Final result                 Please view results for these tests on the individual orders.   Comprehensive metabolic panel   Result Value Ref Range    Sodium 139 136 - 145 mmol/L    Potassium 3.7 3.5 - 5.0 mmol/L    Chloride 104 98 - 107 mmol/L    Carbon Dioxide (CO2) 25 22 - 31 mmol/L    Anion Gap 10 5 - 18 mmol/L    Urea Nitrogen 7 (L) 8 - 22 mg/dL    Creatinine 0.82 0.70 - 1.30 mg/dL    Calcium 9.8 8.5 - 10.5 mg/dL    Glucose 134 (H) 70 - 125 mg/dL    Alkaline Phosphatase 170 (H) 45 - 120 U/L    AST 27 0 - 40 U/L    ALT 34 0 - 45 U/L    Protein Total 8.9 (H) 6.0 - 8.0 g/dL    Albumin 3.5 3.5 - 5.0 g/dL    Bilirubin Total 0.5 0.0 - 1.0 mg/dL    GFR Estimate >90 >60 mL/min/1.73m2   CBC with platelets and differential   Result Value Ref Range    WBC Count 13.6 (H) 4.0 - 11.0 10e3/uL    RBC Count 5.47 4.40 - 5.90 10e6/uL    Hemoglobin 15.6 13.3 - 17.7 g/dL    Hematocrit 46.9 40.0 - 53.0 %    MCV 86 78 - 100 fL    MCH 28.5 26.5 - 33.0 pg    MCHC 33.3 31.5 - 36.5 g/dL    RDW 12.0 10.0 - 15.0 %    Platelet Count 477 (H) 150 - 450 10e3/uL    % Neutrophils 60 %    % Lymphocytes 28 %    % Monocytes 10 %    % Eosinophils 1 %    % Basophils 1 %    % Immature Granulocytes 0 %    NRBCs per 100 WBC 0 <1 /100    Absolute Neutrophils 8.3 1.6 - 8.3 10e3/uL    Absolute Lymphocytes 3.8 0.8 - 5.3 10e3/uL    Absolute Monocytes 1.3 0.0 - 1.3 10e3/uL    Absolute Eosinophils 0.1 0.0 - 0.7 10e3/uL    Absolute Basophils 0.1 0.0 - 0.2 10e3/uL    Absolute Immature Granulocytes 0.0 <=0.4 10e3/uL    Absolute NRBCs 0.0 10e3/uL   Lipase   Result Value Ref Range    Lipase 30 0 - 52 U/L         RADIOLOGY  CT Abdomen Pelvis w Contrast    (Results Pending)          EKG:    Rate: 106bpm  Rhythm: Normal Sinus Rhythm  Axis: Normal  Interval: Normal  Conduction: RBBB  QRS: wide  ST: Normal  T-wave: Normal  QT: Not prolonged  Comparison EKG: RBBB is new compared to 21 Oct  2021  Impression:  No acute ischemic change   I have independently reviewed and interpreted today's EKG, pending Cardiologist read        MEDICATIONS GIVEN IN THE EMERGENCY:  Medications   iopamidol (ISOVUE-370) solution 100 mL (has no administration in time range)       NEW PRESCRIPTIONS STARTED AT TODAY'S ER VISIT  New Prescriptions    No medications on file          I, Satnam Mccormick, am serving as a scribe to document services personally performed by Michael Saunders DO, based on my observations and the provider's statements to me.  I, Michael Saunders DO, attest that Satnam Mccormick is acting in a scribe capacity, has observed my performance of the services and has documented them in accordance with my direction.      Michael Saunders D.O.  Emergency Medicine  [facility] EMERGENCY ROOM  [address] [address]  [phone]  Dept: [phone]     Michael Saunders DO  01/14/22 1429

## 2022-06-26 ENCOUNTER — HEALTH MAINTENANCE LETTER (OUTPATIENT)
Age: 52
End: 2022-06-26

## 2022-11-20 ENCOUNTER — HEALTH MAINTENANCE LETTER (OUTPATIENT)
Age: 52
End: 2022-11-20

## 2023-07-08 ENCOUNTER — HEALTH MAINTENANCE LETTER (OUTPATIENT)
Age: 53
End: 2023-07-08

## 2024-07-07 ENCOUNTER — APPOINTMENT (OUTPATIENT)
Dept: CT IMAGING | Facility: CLINIC | Age: 54
End: 2024-07-07
Attending: EMERGENCY MEDICINE
Payer: COMMERCIAL

## 2024-07-07 ENCOUNTER — HOSPITAL ENCOUNTER (EMERGENCY)
Facility: CLINIC | Age: 54
Discharge: HOME | End: 2024-07-07
Attending: EMERGENCY MEDICINE | Admitting: EMERGENCY MEDICINE
Payer: COMMERCIAL

## 2024-07-07 VITALS
HEIGHT: 74 IN | HEART RATE: 70 BPM | OXYGEN SATURATION: 97 % | TEMPERATURE: 98 F | SYSTOLIC BLOOD PRESSURE: 157 MMHG | DIASTOLIC BLOOD PRESSURE: 96 MMHG | BODY MASS INDEX: 26.56 KG/M2 | WEIGHT: 207 LBS | RESPIRATION RATE: 17 BRPM

## 2024-07-07 DIAGNOSIS — R07.89 ATYPICAL CHEST PAIN: ICD-10-CM

## 2024-07-07 DIAGNOSIS — R06.00 DYSPNEA, UNSPECIFIED TYPE: ICD-10-CM

## 2024-07-07 LAB
ALBUMIN SERPL BCG-MCNC: 4 G/DL (ref 3.5–5.2)
ALP SERPL-CCNC: 202 U/L (ref 40–150)
ALT SERPL W P-5'-P-CCNC: 54 U/L (ref 0–70)
ANION GAP SERPL CALCULATED.3IONS-SCNC: 10 MMOL/L (ref 7–15)
AST SERPL W P-5'-P-CCNC: 42 U/L (ref 0–45)
ATRIAL RATE - MUSE: 77 BPM
BASOPHILS # BLD AUTO: 0.1 10E3/UL (ref 0–0.2)
BASOPHILS NFR BLD AUTO: 1 %
BILIRUB SERPL-MCNC: 1.2 MG/DL
BUN SERPL-MCNC: 10.7 MG/DL (ref 6–20)
CALCIUM SERPL-MCNC: 9.4 MG/DL (ref 8.6–10)
CHLORIDE SERPL-SCNC: 103 MMOL/L (ref 98–107)
CREAT SERPL-MCNC: 0.91 MG/DL (ref 0.67–1.17)
D DIMER PPP FEU-MCNC: 1.29 UG/ML FEU (ref 0–0.5)
DEPRECATED HCO3 PLAS-SCNC: 25 MMOL/L (ref 22–29)
DIASTOLIC BLOOD PRESSURE - MUSE: 93 MMHG
EGFRCR SERPLBLD CKD-EPI 2021: >90 ML/MIN/1.73M2
EOSINOPHIL # BLD AUTO: 0.1 10E3/UL (ref 0–0.7)
EOSINOPHIL NFR BLD AUTO: 1 %
ERYTHROCYTE [DISTWIDTH] IN BLOOD BY AUTOMATED COUNT: 12.8 % (ref 10–15)
GLUCOSE SERPL-MCNC: 101 MG/DL (ref 70–99)
HCT VFR BLD AUTO: 44 % (ref 40–53)
HGB BLD-MCNC: 14.6 G/DL (ref 13.3–17.7)
HOLD SPECIMEN: NORMAL
IMM GRANULOCYTES # BLD: 0.1 10E3/UL
IMM GRANULOCYTES NFR BLD: 0 %
INR PPP: 0.97 (ref 0.85–1.15)
INTERPRETATION ECG - MUSE: NORMAL
LIPASE SERPL-CCNC: 30 U/L (ref 13–60)
LYMPHOCYTES # BLD AUTO: 3 10E3/UL (ref 0.8–5.3)
LYMPHOCYTES NFR BLD AUTO: 24 %
MCH RBC QN AUTO: 29.1 PG (ref 26.5–33)
MCHC RBC AUTO-ENTMCNC: 33.2 G/DL (ref 31.5–36.5)
MCV RBC AUTO: 88 FL (ref 78–100)
MONOCYTES # BLD AUTO: 1.1 10E3/UL (ref 0–1.3)
MONOCYTES NFR BLD AUTO: 9 %
NEUTROPHILS # BLD AUTO: 8.1 10E3/UL (ref 1.6–8.3)
NEUTROPHILS NFR BLD AUTO: 65 %
NRBC # BLD AUTO: 0 10E3/UL
NRBC BLD AUTO-RTO: 0 /100
P AXIS - MUSE: 29 DEGREES
PLATELET # BLD AUTO: 322 10E3/UL (ref 150–450)
POTASSIUM SERPL-SCNC: 4 MMOL/L (ref 3.4–5.3)
PR INTERVAL - MUSE: 118 MS
PROT SERPL-MCNC: 7.9 G/DL (ref 6.4–8.3)
QRS DURATION - MUSE: 142 MS
QT - MUSE: 412 MS
QTC - MUSE: 466 MS
R AXIS - MUSE: 41 DEGREES
RBC # BLD AUTO: 5.02 10E6/UL (ref 4.4–5.9)
SODIUM SERPL-SCNC: 138 MMOL/L (ref 135–145)
SYSTOLIC BLOOD PRESSURE - MUSE: 159 MMHG
T AXIS - MUSE: 27 DEGREES
TROPONIN T SERPL HS-MCNC: 7 NG/L
VENTRICULAR RATE- MUSE: 77 BPM
WBC # BLD AUTO: 12.5 10E3/UL (ref 4–11)

## 2024-07-07 PROCEDURE — 250N000011 HC RX IP 250 OP 636: Performed by: EMERGENCY MEDICINE

## 2024-07-07 PROCEDURE — 85025 COMPLETE CBC W/AUTO DIFF WBC: CPT | Performed by: EMERGENCY MEDICINE

## 2024-07-07 PROCEDURE — 71275 CT ANGIOGRAPHY CHEST: CPT

## 2024-07-07 PROCEDURE — 96375 TX/PRO/DX INJ NEW DRUG ADDON: CPT | Mod: 59

## 2024-07-07 PROCEDURE — 85379 FIBRIN DEGRADATION QUANT: CPT | Performed by: EMERGENCY MEDICINE

## 2024-07-07 PROCEDURE — 36415 COLL VENOUS BLD VENIPUNCTURE: CPT | Performed by: EMERGENCY MEDICINE

## 2024-07-07 PROCEDURE — 99285 EMERGENCY DEPT VISIT HI MDM: CPT | Mod: 25

## 2024-07-07 PROCEDURE — 84484 ASSAY OF TROPONIN QUANT: CPT | Performed by: EMERGENCY MEDICINE

## 2024-07-07 PROCEDURE — 83690 ASSAY OF LIPASE: CPT | Performed by: EMERGENCY MEDICINE

## 2024-07-07 PROCEDURE — 96374 THER/PROPH/DIAG INJ IV PUSH: CPT | Mod: 59

## 2024-07-07 PROCEDURE — 85610 PROTHROMBIN TIME: CPT | Performed by: EMERGENCY MEDICINE

## 2024-07-07 PROCEDURE — 82040 ASSAY OF SERUM ALBUMIN: CPT | Performed by: EMERGENCY MEDICINE

## 2024-07-07 PROCEDURE — 93005 ELECTROCARDIOGRAM TRACING: CPT | Performed by: EMERGENCY MEDICINE

## 2024-07-07 RX ORDER — ONDANSETRON 2 MG/ML
4 INJECTION INTRAMUSCULAR; INTRAVENOUS ONCE
Status: COMPLETED | OUTPATIENT
Start: 2024-07-07 | End: 2024-07-07

## 2024-07-07 RX ORDER — IOPAMIDOL 755 MG/ML
100 INJECTION, SOLUTION INTRAVASCULAR ONCE
Status: COMPLETED | OUTPATIENT
Start: 2024-07-07 | End: 2024-07-07

## 2024-07-07 RX ORDER — MORPHINE SULFATE 4 MG/ML
4 INJECTION, SOLUTION INTRAMUSCULAR; INTRAVENOUS ONCE
Status: COMPLETED | OUTPATIENT
Start: 2024-07-07 | End: 2024-07-07

## 2024-07-07 RX ADMIN — IOPAMIDOL 100 ML: 755 INJECTION, SOLUTION INTRAVENOUS at 05:24

## 2024-07-07 RX ADMIN — MORPHINE SULFATE 4 MG: 4 INJECTION, SOLUTION INTRAMUSCULAR; INTRAVENOUS at 04:46

## 2024-07-07 RX ADMIN — ONDANSETRON 4 MG: 2 INJECTION INTRAMUSCULAR; INTRAVENOUS at 04:47

## 2024-07-07 ASSESSMENT — ACTIVITIES OF DAILY LIVING (ADL)
ADLS_ACUITY_SCORE: 35
ADLS_ACUITY_SCORE: 35

## 2024-07-07 ASSESSMENT — COLUMBIA-SUICIDE SEVERITY RATING SCALE - C-SSRS
1. IN THE PAST MONTH, HAVE YOU WISHED YOU WERE DEAD OR WISHED YOU COULD GO TO SLEEP AND NOT WAKE UP?: NO
2. HAVE YOU ACTUALLY HAD ANY THOUGHTS OF KILLING YOURSELF IN THE PAST MONTH?: NO
6. HAVE YOU EVER DONE ANYTHING, STARTED TO DO ANYTHING, OR PREPARED TO DO ANYTHING TO END YOUR LIFE?: NO

## 2024-07-07 NOTE — DISCHARGE INSTRUCTIONS
Is important that you follow-up with a primary care provider within the next 2 to 3 days for recheck  Return to the emergency department for worsening problems or concerns  Tylenol 650 mg every 4 hours as needed for pain

## 2024-07-07 NOTE — ED TRIAGE NOTES
"Patient reports chest pain the last 24 hours, feels like chest is being \"stepped on.\" Patient endorses shortness of breath. Patient states he just got back from a trip up [place]     Triage Assessment (Adult)       Row Name 07/07/24 0432          Triage Assessment    Airway WDL WDL        Respiratory WDL    Respiratory WDL WDL        Skin Circulation/Temperature WDL    Skin Circulation/Temperature WDL WDL        Cardiac WDL    Cardiac WDL X;chest pain        Chest Pain Assessment    Chest Pain Location anterior chest, left        Peripheral/Neurovascular WDL    Peripheral Neurovascular WDL WDL        Cognitive/Neuro/Behavioral WDL    Cognitive/Neuro/Behavioral WDL WDL                     "

## 2024-07-07 NOTE — ED PROVIDER NOTES
EMERGENCY DEPARTMENT ENCOUnter      NAME: Jonna Dumont  AGE: 53 year old male  YOB: 1970  MRN: [id number]  EVALUATION DATE & TIME: 7/7/2024  4:30 AM    PCP: System, Provider Not In    ED PROVIDER: Fariba Bales MD      Chief Complaint   Patient presents with    Shortness of Breath    Chest Pain         FINAL IMPRESSION:  1. Atypical chest pain    2. Dyspnea, unspecified type          ED COURSE & MEDICAL DECISION MAKING:      In summary, the patient is a 53-year-old male that presents to the emergency department for evaluation of chest pain and shortness of breath.  No clear etiology was found for his symptoms.  He has no evidence of pulmonary emboli, pneumonia, pneumothorax, or ACS.  Recommended close outpatient follow-up with primary care for reevaluation.      0436 - I evaluated patient.  Morphine 4 mg IV was administered for pain.  Zofran 4 mg IV was administered for nausea.  0622 - Patient will be discharged home.    Medical Decision Making  Obtained supplemental history:Supplemental history obtained?: Documented in chart  Reviewed external records: External records reviewed?: Documented in chart  Care impacted by chronic illness:Cancer/Chemotherapy and Other: Factor V Leiden mutation  Care significantly affected by social determinants of health:Access to Affordable Health Care  Did you consider but not order tests?: Work up considered but not performed and documented in chart, if applicable  Did you interpret images independently?: Independent interpretation of ECG and images noted in documentation, when applicable.  Consultation discussion with other provider:Did you involve another provider (consultant, , pharmacy, etc.)?: No  Discharge. No recommendations on prescription strength medication(s). See documentation for any additional details.      At the conclusion of the encounter I discussed the results of all of the tests and the disposition. The questions were answered. The  "patient or family acknowledged understanding and was agreeable with the care plan.         MEDICATIONS GIVEN IN THE EMERGENCY:  Medications   morphine (PF) injection 4 mg (4 mg Intravenous $Given 7/7/24 6096)   ondansetron (ZOFRAN) injection 4 mg (4 mg Intravenous $Given 7/7/24 0087)   iopamidol (ISOVUE-370) solution 100 mL (100 mLs Intravenous $Given 7/7/24 4141)       NEW PRESCRIPTIONS STARTED AT TODAY'S ER VISIT  New Prescriptions    No medications on file          =================================================================    HPI        Jonna Dumont is a 53 year old male with a pertinent history of colon cancer, Factor V Leiden mutation, ulcerative colitis, CBD obstruction, SIRS, and acute cholangitis, who presents to this ED via private car for evaluation of shortness of breath and chest pain.    Patient reports he started to have constant chest pain that lasted 24 hours and notes pain radiates across his chest but pain is mainly localized in his left-sided chest and did radiate to his left shoulder at one point. Pain starts diffusely across his chest then radiates down to his left-sided abdomen. Pain radiates to his back and chest as well. He describes the chest pain as feeling like he is being \"stepped on\". Rates pain a 8/10. States it hurts to lie down flat and pain worsens with taking a breath in. He denies associated nausea or diaphoresis with this episode. He has a hx of PE and notes symptoms he has now doesn't feel similar to pain he had with previous PE years ago. He endorses feeling short of breath. Denies fever, chills, or cough.    He traveled to [place] 3 weeks to a month and recently traveled to [place]. Mentions he was taken off blood thinners a while ago for Factor V Leiden. He states he has epigastric abdominal pain. He mentions he smoked marijuana on 7/1 but no recent drug use. No other reported complaints or concerns at this time.      REVIEW OF SYSTEMS   Refer to HPI, otherwise " negative      PAST MEDICAL HISTORY:  Past Medical History:   Diagnosis Date    Colon cancer (H)     Factor V Leiden mutation (H24)     Pulmonary embolism (H)     Ulcerative colitis        PAST SURGICAL HISTORY:  Past Surgical History:   Procedure Laterality Date    BACK SURGERY      lumbar discectomy    COLON SURGERY      ENDOSCOPIC RETROGRADE CHOLANGIOPANCREATOGRAM  5/23/2016    Procedure: ENDOSCOPIC RETROGRADE CHOLANGIOPANCREATOGRAPHY WITH BALOON DILATION BILE DUCT STRICTURE WITH BILIARY BRUSHINGS WITH SLUDGE REMOVAL;  Surgeon: Michael Jiménez MD;  Location: [facility] OR;  Service:     IR ERCP  3/21/2018    IR ERCP  1/23/2018    PROCTOSCOPY  5/3/2011    Procedure:PROCTOSCOPY; Rectal Exam Under Anesthesia , with Smith scope, Anoscopy, Pouchoscopy with biopsies Flexible sigmoidoscopy with biopsies; Surgeon:SAMEER LOPEZ; Location:[facility] OR    SIGMOIDOSCOPY FLEXIBLE  5/3/2011    Procedure:SIGMOIDOSCOPY FLEXIBLE; for Pouchoscopy with biopsies ; Surgeon:SAMEER LOPEZ; Location:[facility] OR           CURRENT MEDICATIONS:    ibuprofen (ADVIL/MOTRIN) 200 MG tablet        ALLERGIES:  Allergies   Allergen Reactions    Azithromycin Shortness Of Breath       FAMILY HISTORY:  No family history on file.    SOCIAL HISTORY:   Social History     Socioeconomic History    Marital status:    Tobacco Use    Smoking status: Every Day     Current packs/day: 0.25     Average packs/day: 0.3 packs/day for 4.0 years (1.0 ttl pk-yrs)     Types: Cigarettes   Substance and Sexual Activity    Alcohol use: No     Comment: rare    Drug use: No     Social Determinants of Health     Financial Resource Strain: Not At Risk (1/26/2024)    Received from [facility]    Financial Resource Strain     Is it hard for you to pay for the very basics like food, housing, medical care or heating?: No   Food Insecurity: Not At Risk (1/26/2024)    Received from [employer] [facility]    Food Insecurity     Does your food run out before you have the money  "to buy more?: No   Transportation Needs: Not At Risk (1/26/2024)    Received from [facility]    Transportation Needs     Does a lack of transportation keep you from your medical appointments or from getting your medications?: No   Physical Activity: Insufficiently Active (10/17/2022)    Received from [facility]    Exercise Vital Sign     Days of Exercise per Week: 2 days     Minutes of Exercise per Session: 20 min   Stress: Stress Concern Present (10/17/2022)    Received from [facility]    [place] [employer] - Occupational Stress Questionnaire     Feeling of Stress : To some extent   Social Connections: Socially Integrated (10/17/2022)    Received from [facility]    Social Connection and Isolation Panel [NHANES]     Frequency of Communication with Friends and Family: Once a week     Frequency of Social Gatherings with Friends and Family: Three times a week     Attends Orthodox Services: 1 to 4 times per year     Active Member of Clubs or Organizations: Yes     Attends Club or Organization Meetings: More than 4 times per year     Marital Status:    Interpersonal Safety: Unknown (10/17/2022)    Received from [facility]    Humiliation, Afraid, Rape, and Kick questionnaire     Fear of Current or Ex-Partner: No   Housing Stability: Low Risk  (10/17/2022)    Received from [facility]    Housing Stability Vital Sign     Unable to Pay for Housing in the Last Year: No     Number of Places Lived in the Last Year: 1     Unstable Housing in the Last Year: No       VITALS:  Patient Vitals for the past 24 hrs:   BP Temp Temp src Pulse Resp SpO2 Height Weight   07/07/24 0500 (!) 157/96 -- -- 79 18 96 % -- --   07/07/24 0445 (!) 161/94 -- -- 77 14 92 % -- --   07/07/24 0400 (!) 159/93 98  F (36.7  C) Oral 85 22 98 % 1.88 m (6' 2\") 93.9 kg (207 lb)       PHYSICAL EXAM    Constitutional:  Well developed, Well nourished,  HENT:  Normocephalic, Atraumatic, Bilateral external ears normal, Oropharynx " moist, Nose normal.   Neck:  Normal range of motion, No meningismus, No stridor.   Eyes:  EOMI, Conjunctiva normal, No discharge.   Respiratory:  Normal breath sounds, No respiratory distress, No wheezing, No chest tenderness.   Cardiovascular:  Normal heart rate, Normal rhythm, No murmurs  GI:  Soft, No tenderness, No guarding,   Musculoskeletal:  Neurovascularly intact distally, No edema, No tenderness, No cyanosis, Good range of motion in all major joints.   Integument:  Warm, Dry, No erythema, No rash.   Lymphatic:  No lymphadenopathy noted.   Neurologic:  Alert & oriented , Normal motor function,  No focal deficits noted.   Psychiatric:  Affect normal, Judgment normal, Mood normal.      LAB:  All pertinent labs reviewed and interpreted.  Results for orders placed or performed during the hospital encounter of 07/07/24   CTA Chest Abdomen Pelvis w Contrast    Impression    IMPRESSION:  1.  No aortic dissection or other acute abnormality identified.  2.  Known chronic liver disease is not well-demonstrated on today's exam.     Result Value Ref Range    INR 0.97 0.85 - 1.15   Comprehensive metabolic panel   Result Value Ref Range    Sodium 138 135 - 145 mmol/L    Potassium 4.0 3.4 - 5.3 mmol/L    Carbon Dioxide (CO2) 25 22 - 29 mmol/L    Anion Gap 10 7 - 15 mmol/L    Urea Nitrogen 10.7 6.0 - 20.0 mg/dL    Creatinine 0.91 0.67 - 1.17 mg/dL    GFR Estimate >90 >60 mL/min/1.73m2    Calcium 9.4 8.6 - 10.0 mg/dL    Chloride 103 98 - 107 mmol/L    Glucose 101 (H) 70 - 99 mg/dL    Alkaline Phosphatase 202 (H) 40 - 150 U/L    AST 42 0 - 45 U/L    ALT 54 0 - 70 U/L    Protein Total 7.9 6.4 - 8.3 g/dL    Albumin 4.0 3.5 - 5.2 g/dL    Bilirubin Total 1.2 <=1.2 mg/dL   Result Value Ref Range    Lipase 30 13 - 60 U/L   Result Value Ref Range    Troponin T, High Sensitivity 7 <=22 ng/L   CBC with platelets and differential   Result Value Ref Range    WBC Count 12.5 (H) 4.0 - 11.0 10e3/uL    RBC Count 5.02 4.40 - 5.90 10e6/uL     Hemoglobin 14.6 13.3 - 17.7 g/dL    Hematocrit 44.0 40.0 - 53.0 %    MCV 88 78 - 100 fL    MCH 29.1 26.5 - 33.0 pg    MCHC 33.2 31.5 - 36.5 g/dL    RDW 12.8 10.0 - 15.0 %    Platelet Count 322 150 - 450 10e3/uL    % Neutrophils 65 %    % Lymphocytes 24 %    % Monocytes 9 %    % Eosinophils 1 %    % Basophils 1 %    % Immature Granulocytes 0 %    NRBCs per 100 WBC 0 <1 /100    Absolute Neutrophils 8.1 1.6 - 8.3 10e3/uL    Absolute Lymphocytes 3.0 0.8 - 5.3 10e3/uL    Absolute Monocytes 1.1 0.0 - 1.3 10e3/uL    Absolute Eosinophils 0.1 0.0 - 0.7 10e3/uL    Absolute Basophils 0.1 0.0 - 0.2 10e3/uL    Absolute Immature Granulocytes 0.1 <=0.4 10e3/uL    Absolute NRBCs 0.0 10e3/uL   Extra Red Top Tube   Result Value Ref Range    Hold Specimen JI    D dimer quantitative   Result Value Ref Range    D-Dimer Quantitative 1.29 (H) 0.00 - 0.50 ug/mL FEU   ECG 12-LEAD WITH MUSE (L[facility])   Result Value Ref Range    Systolic Blood Pressure 159 mmHg    Diastolic Blood Pressure 93 mmHg    Ventricular Rate 77 BPM    Atrial Rate 77 BPM    WV Interval 118 ms    QRS Duration 142 ms     ms    QTc 466 ms    P Axis 29 degrees    R AXIS 41 degrees    T Axis 27 degrees    Interpretation ECG       Sinus rhythm  Right bundle branch block  Abnormal ECG  When compared with ECG of 2022 23:04,  T wave inversion no longer evident in Anterior leads  Confirmed by SEE ED PROVIDER NOTE FOR, ECG INTERPRETATION ([id number]),  PREMA WALTER ([id number]) on 2024 5:00:14 AM         RADIOLOGY:  I have independently reviewed and interpreted the above imaging, pending the final radiology read.  CTA Chest Abdomen Pelvis w Contrast   Final Result   IMPRESSION:   1.  No aortic dissection or other acute abnormality identified.   2.  Known chronic liver disease is not well-demonstrated on today's exam.             EK-rate is 77, sinus, right bundle branch block, EKG is improved from previous EKG in 2022  I have independently reviewed  and interpreted this EKG          I, Natalia Alcocer, am serving as a scribe to document services personally performed by Dr. Bales based on my observation and the provider's statements to me. I, Fariba Bales MD attest that Natalia Alcocer is acting in a scribe capacity, has observed my performance of the services and has documented them in accordance with my direction.    Fariba Bales MD  Emergency Medicine  [facility] EMERGENCY ROOM  [address] [id number] [phone]  Dept: [phone]     Fariba Bales MD  07/07/24 6321

## 2024-08-31 ENCOUNTER — HEALTH MAINTENANCE LETTER (OUTPATIENT)
Age: 54
End: 2024-08-31

## 2024-10-17 ENCOUNTER — APPOINTMENT (OUTPATIENT)
Dept: CT IMAGING | Facility: CLINIC | Age: 54
End: 2024-10-17
Attending: EMERGENCY MEDICINE
Payer: COMMERCIAL

## 2024-10-17 ENCOUNTER — HOSPITAL ENCOUNTER (EMERGENCY)
Facility: CLINIC | Age: 54
Discharge: HOME | End: 2024-10-17
Attending: EMERGENCY MEDICINE | Admitting: EMERGENCY MEDICINE
Payer: COMMERCIAL

## 2024-10-17 VITALS
TEMPERATURE: 97.7 F | RESPIRATION RATE: 16 BRPM | DIASTOLIC BLOOD PRESSURE: 64 MMHG | OXYGEN SATURATION: 95 % | SYSTOLIC BLOOD PRESSURE: 115 MMHG | HEART RATE: 71 BPM

## 2024-10-17 DIAGNOSIS — E86.0 MILD DEHYDRATION: ICD-10-CM

## 2024-10-17 DIAGNOSIS — K52.9 COLITIS: ICD-10-CM

## 2024-10-17 DIAGNOSIS — R19.7 DIARRHEA, UNSPECIFIED TYPE: ICD-10-CM

## 2024-10-17 LAB
ALBUMIN SERPL BCG-MCNC: 3.8 G/DL (ref 3.5–5.2)
ALBUMIN UR-MCNC: 10 MG/DL
ALP SERPL-CCNC: 211 U/L (ref 40–150)
ALT SERPL W P-5'-P-CCNC: 44 U/L (ref 0–70)
ANION GAP SERPL CALCULATED.3IONS-SCNC: 14 MMOL/L (ref 7–15)
APPEARANCE UR: CLEAR
AST SERPL W P-5'-P-CCNC: 29 U/L (ref 0–45)
ATRIAL RATE - MUSE: 82 BPM
BASE EXCESS BLDV CALC-SCNC: -0.6 MMOL/L (ref -3–3)
BASOPHILS # BLD AUTO: 0.1 10E3/UL (ref 0–0.2)
BASOPHILS NFR BLD AUTO: 1 %
BILIRUB SERPL-MCNC: 0.9 MG/DL
BILIRUB UR QL STRIP: NEGATIVE
BUN SERPL-MCNC: 14.9 MG/DL (ref 6–20)
CALCIUM SERPL-MCNC: 9.5 MG/DL (ref 8.8–10.4)
CHLORIDE SERPL-SCNC: 96 MMOL/L (ref 98–107)
COLOR UR AUTO: ABNORMAL
CREAT SERPL-MCNC: 1.13 MG/DL (ref 0.67–1.17)
CRP SERPL-MCNC: 167 MG/L
DIASTOLIC BLOOD PRESSURE - MUSE: 67 MMHG
EGFRCR SERPLBLD CKD-EPI 2021: 77 ML/MIN/1.73M2
EOSINOPHIL # BLD AUTO: 0.1 10E3/UL (ref 0–0.7)
EOSINOPHIL NFR BLD AUTO: 1 %
ERYTHROCYTE [DISTWIDTH] IN BLOOD BY AUTOMATED COUNT: 12.3 % (ref 10–15)
GLUCOSE SERPL-MCNC: 199 MG/DL (ref 70–99)
GLUCOSE UR STRIP-MCNC: NEGATIVE MG/DL
HCO3 BLDV-SCNC: 24 MMOL/L (ref 21–28)
HCO3 SERPL-SCNC: 21 MMOL/L (ref 22–29)
HCT VFR BLD AUTO: 49.1 % (ref 40–53)
HGB BLD-MCNC: 16.8 G/DL (ref 13.3–17.7)
HGB UR QL STRIP: NEGATIVE
IMM GRANULOCYTES # BLD: 0 10E3/UL
IMM GRANULOCYTES NFR BLD: 0 %
INR PPP: 1.09 (ref 0.85–1.15)
INTERPRETATION ECG - MUSE: NORMAL
KETONES UR STRIP-MCNC: NEGATIVE MG/DL
LACTATE SERPL-SCNC: 0.9 MMOL/L (ref 0.7–2)
LACTATE SERPL-SCNC: 2.3 MMOL/L (ref 0.7–2)
LEUKOCYTE ESTERASE UR QL STRIP: NEGATIVE
LIPASE SERPL-CCNC: 18 U/L (ref 13–60)
LYMPHOCYTES # BLD AUTO: 2.2 10E3/UL (ref 0.8–5.3)
LYMPHOCYTES NFR BLD AUTO: 21 %
MCH RBC QN AUTO: 29.3 PG (ref 26.5–33)
MCHC RBC AUTO-ENTMCNC: 34.2 G/DL (ref 31.5–36.5)
MCV RBC AUTO: 86 FL (ref 78–100)
MONOCYTES # BLD AUTO: 1.4 10E3/UL (ref 0–1.3)
MONOCYTES NFR BLD AUTO: 14 %
NEUTROPHILS # BLD AUTO: 6.5 10E3/UL (ref 1.6–8.3)
NEUTROPHILS NFR BLD AUTO: 63 %
NITRATE UR QL: NEGATIVE
NRBC # BLD AUTO: 0 10E3/UL
NRBC BLD AUTO-RTO: 0 /100
O2/TOTAL GAS SETTING VFR VENT: 21 %
OXYHGB MFR BLDV: 85 % (ref 70–75)
P AXIS - MUSE: 50 DEGREES
PCO2 BLDV: 38 MM HG (ref 40–50)
PH BLDV: 7.41 [PH] (ref 7.32–7.43)
PH UR STRIP: 6.5 [PH] (ref 5–7)
PLATELET # BLD AUTO: 351 10E3/UL (ref 150–450)
PO2 BLDV: 51 MM HG (ref 25–47)
POTASSIUM SERPL-SCNC: 4 MMOL/L (ref 3.4–5.3)
PR INTERVAL - MUSE: 126 MS
PROT SERPL-MCNC: 7.8 G/DL (ref 6.4–8.3)
QRS DURATION - MUSE: 136 MS
QT - MUSE: 376 MS
QTC - MUSE: 439 MS
R AXIS - MUSE: 59 DEGREES
RBC # BLD AUTO: 5.74 10E6/UL (ref 4.4–5.9)
RBC URINE: <1 /HPF
SAO2 % BLDV: 85.7 % (ref 70–75)
SODIUM SERPL-SCNC: 131 MMOL/L (ref 135–145)
SP GR UR STRIP: <1.005 (ref 1–1.03)
SYSTOLIC BLOOD PRESSURE - MUSE: 144 MMHG
T AXIS - MUSE: 38 DEGREES
UROBILINOGEN UR STRIP-MCNC: <2 MG/DL
VENTRICULAR RATE- MUSE: 82 BPM
WBC # BLD AUTO: 10.3 10E3/UL (ref 4–11)
WBC URINE: <1 /HPF

## 2024-10-17 PROCEDURE — 83605 ASSAY OF LACTIC ACID: CPT | Performed by: EMERGENCY MEDICINE

## 2024-10-17 PROCEDURE — 93005 ELECTROCARDIOGRAM TRACING: CPT | Performed by: EMERGENCY MEDICINE

## 2024-10-17 PROCEDURE — 83690 ASSAY OF LIPASE: CPT | Performed by: EMERGENCY MEDICINE

## 2024-10-17 PROCEDURE — 85004 AUTOMATED DIFF WBC COUNT: CPT | Performed by: EMERGENCY MEDICINE

## 2024-10-17 PROCEDURE — 81003 URINALYSIS AUTO W/O SCOPE: CPT | Performed by: EMERGENCY MEDICINE

## 2024-10-17 PROCEDURE — 96361 HYDRATE IV INFUSION ADD-ON: CPT

## 2024-10-17 PROCEDURE — 36415 COLL VENOUS BLD VENIPUNCTURE: CPT | Performed by: EMERGENCY MEDICINE

## 2024-10-17 PROCEDURE — 86140 C-REACTIVE PROTEIN: CPT | Performed by: EMERGENCY MEDICINE

## 2024-10-17 PROCEDURE — 258N000003 HC RX IP 258 OP 636: Performed by: EMERGENCY MEDICINE

## 2024-10-17 PROCEDURE — 250N000011 HC RX IP 250 OP 636: Performed by: EMERGENCY MEDICINE

## 2024-10-17 PROCEDURE — 82805 BLOOD GASES W/O2 SATURATION: CPT | Performed by: EMERGENCY MEDICINE

## 2024-10-17 PROCEDURE — 74177 CT ABD & PELVIS W/CONTRAST: CPT

## 2024-10-17 PROCEDURE — 96360 HYDRATION IV INFUSION INIT: CPT | Mod: 59

## 2024-10-17 PROCEDURE — 80053 COMPREHEN METABOLIC PANEL: CPT | Performed by: EMERGENCY MEDICINE

## 2024-10-17 PROCEDURE — 85610 PROTHROMBIN TIME: CPT | Performed by: EMERGENCY MEDICINE

## 2024-10-17 PROCEDURE — 99285 EMERGENCY DEPT VISIT HI MDM: CPT | Mod: 25

## 2024-10-17 RX ORDER — IOPAMIDOL 755 MG/ML
90 INJECTION, SOLUTION INTRAVASCULAR ONCE
Status: COMPLETED | OUTPATIENT
Start: 2024-10-17 | End: 2024-10-17

## 2024-10-17 RX ORDER — WADDING
500 EACH MISCELLANEOUS ONCE
Status: DISCONTINUED | OUTPATIENT
Start: 2024-10-17 | End: 2024-10-17

## 2024-10-17 RX ORDER — ONDANSETRON 2 MG/ML
4 INJECTION INTRAMUSCULAR; INTRAVENOUS
Status: DISCONTINUED | OUTPATIENT
Start: 2024-10-17 | End: 2024-10-17 | Stop reason: HOSPADM

## 2024-10-17 RX ORDER — OXYCODONE HYDROCHLORIDE 5 MG/1
5 TABLET ORAL EVERY 6 HOURS PRN
Qty: 12 TABLET | Refills: 0 | Status: SHIPPED | OUTPATIENT
Start: 2024-10-17

## 2024-10-17 RX ORDER — MORPHINE SULFATE 15 MG/1
7.5 TABLET ORAL EVERY 4 HOURS PRN
Qty: 8 TABLET | Refills: 0 | Status: SHIPPED | OUTPATIENT
Start: 2024-10-17

## 2024-10-17 RX ADMIN — IOPAMIDOL 90 ML: 755 INJECTION, SOLUTION INTRAVENOUS at 06:57

## 2024-10-17 RX ADMIN — SODIUM CHLORIDE 500 ML: 9 INJECTION, SOLUTION INTRAVENOUS at 08:33

## 2024-10-17 RX ADMIN — SODIUM CHLORIDE 1000 ML: 9 INJECTION, SOLUTION INTRAVENOUS at 05:59

## 2024-10-17 RX ADMIN — HYDROMORPHONE HYDROCHLORIDE 1 MG: 1 INJECTION, SOLUTION INTRAMUSCULAR; INTRAVENOUS; SUBCUTANEOUS at 08:59

## 2024-10-17 ASSESSMENT — ACTIVITIES OF DAILY LIVING (ADL)
ADLS_ACUITY_SCORE: 35

## 2024-10-17 NOTE — ED PROVIDER NOTES
EMERGENCY DEPARTMENT ENCOUnter      NAME: Jonna Dumont  AGE: 54 year old male  YOB: 1970  MRN: [id number]  EVALUATION DATE & TIME: 10/17/2024  5:24 AM    PCP: Bucky Villeda    ED PROVIDER: Fariba Bales MD      Chief Complaint   Patient presents with    Abdominal Pain    Nausea    Diarrhea         FINAL IMPRESSION:  1. Diarrhea, unspecified type    2. Mild dehydration          ED COURSE & MEDICAL DECISION MAKING:      In summary, the patient is a 54-year-old male that presents to the emergency department for evaluation of diarrhea, nausea, and abdominal pain.  With the patient's history of ulcerative colitis and colon cancer, CT abdomen pelvis was obtained to further evaluate his abdominal pain, and is pending at change of shift.  Based on his mildly elevated lactic acid I suspect he has mild dehydration from diarrhea.    0530-evaluated patient.  Normal saline 1 L IV was administered for IV hydration.  0730-Signed out at change of shift with CT and repeat lactic acid pending.    Medical Decision Making  Obtained supplemental history:Supplemental history obtained?: Documented in chart and N/A  Reviewed external records: External records reviewed?: No  Care impacted by chronic illness:Other: ulcerative colitis  Did you consider but not order tests?: Work up considered but not performed and documented in chart, if applicable  Did you interpret images independently?: Independent interpretation of ECG and images noted in documentation, when applicable.  Consultation discussion with other provider:Did you involve another provider (consultant, , pharmacy, etc.)?: No      MIPS: Not Applicable        At the conclusion of the encounter I discussed the results of all of the tests and the disposition. The questions were answered. The patient or family acknowledged understanding and was agreeable with the care plan.         MEDICATIONS GIVEN IN THE EMERGENCY:  Medications   sodium chloride 0.9%  BOLUS 1,000 mL (0 mLs Intravenous Stopped 10/17/24 0719)   iopamidol (ISOVUE-370) solution 90 mL (90 mLs Intravenous $Given 10/17/24 0657)       NEW PRESCRIPTIONS STARTED AT TODAY'S ER VISIT  New Prescriptions    No medications on file          =================================================================    HPI        Jonna Dumont is a 54 year old male with a pertinent history of ulcerative colitis and colon cancer presents to the emergency department for evaluation of diarrhea, nausea and abdominal pain that started 3 days ago.  He states that he has had about 3-4 episodes of diarrhea daily which had improved yesterday afternoon but then recurred this morning.  Has nausea but no vomiting.  He has diffuse abdominal pain which she describes as sharp, constant, 5 out of 10 in intensity, and not relieved or exacerbated by any particular activity.  He denies any ill exposures.  He denies any unusual travel.      REVIEW OF SYSTEMS     Constitutional:  Denies fever or chills  HENT:  Denies sore throat   Respiratory:  Denies cough or shortness of breath   Cardiovascular:  Denies chest pain or palpitations  GI: abdominal pain, nausea, diarrhea  Musculoskeletal:  Denies any new extremity pain   Skin:  Denies rash   Neurologic:  Denies headache, focal weakness or sensory changes    All other systems reviewed and are negative      PAST MEDICAL HISTORY:  Past Medical History:   Diagnosis Date    Colon cancer (H)     Factor V Leiden mutation (H)     Pulmonary embolism (H)     Ulcerative colitis        PAST SURGICAL HISTORY:  Past Surgical History:   Procedure Laterality Date    BACK SURGERY      lumbar discectomy    COLON SURGERY      ENDOSCOPIC RETROGRADE CHOLANGIOPANCREATOGRAM  5/23/2016    Procedure: ENDOSCOPIC RETROGRADE CHOLANGIOPANCREATOGRAPHY WITH BALOON DILATION BILE DUCT STRICTURE WITH BILIARY BRUSHINGS WITH SLUDGE REMOVAL;  Surgeon: Michael Jiménez MD;  Location: [facility];  Service:     IR ERCP   3/21/2018    IR ERCP  1/23/2018    PROCTOSCOPY  5/3/2011    Procedure:PROCTOSCOPY; Rectal Exam Under Anesthesia , with Smith scope, Anoscopy, Pouchoscopy with biopsies Flexible sigmoidoscopy with biopsies; Surgeon:SAMEER LOPEZ; Location:[facility]    SIGMOIDOSCOPY FLEXIBLE  5/3/2011    Procedure:SIGMOIDOSCOPY FLEXIBLE; for Pouchoscopy with biopsies ; Surgeon:SAMEER LOPEZ; Location:[facility] OR           CURRENT MEDICATIONS:    ibuprofen (ADVIL/MOTRIN) 200 MG tablet        ALLERGIES:  Allergies   Allergen Reactions    Azithromycin Shortness Of Breath       FAMILY HISTORY:  No family history on file.    SOCIAL HISTORY:   Social History     Socioeconomic History    Marital status:    Tobacco Use    Smoking status: Every Day     Current packs/day: 0.25     Average packs/day: 0.3 packs/day for 4.0 years (1.0 ttl pk-yrs)     Types: Cigarettes   Substance and Sexual Activity    Alcohol use: No     Comment: rare    Drug use: No     Social Determinants of Health     Financial Resource Strain: Not At Risk (1/26/2024)    Received from [facility]    Financial Resource Strain     Is it hard for you to pay for the very basics like food, housing, medical care or heating?: No   Food Insecurity: Not At Risk (1/26/2024)    Received from [facility]    Food Insecurity     Does your food run out before you have the money to buy more?: No   Transportation Needs: Not At Risk (1/26/2024)    Received from [facility]    Transportation Needs     Does a lack of transportation keep you from your medical appointments or from getting your medications?: No   Physical Activity: Insufficiently Active (10/17/2022)    Received from [facility]    Exercise Vital Sign     Days of Exercise per Week: 2 days     Minutes of Exercise per Session: 20 min   Stress: Stress Concern Present (10/17/2022)    Received from [facility]    [employer] - Occupational Stress Questionnaire     Feeling of Stress : To some extent    Social Connections: Socially Integrated (10/17/2022)    Received from [facility]    Social Connection and Isolation Panel [NHANES]     Frequency of Communication with Friends and Family: Once a week     Frequency of Social Gatherings with Friends and Family: Three times a week     Attends Judaism Services: 1 to 4 times per year     Active Member of Clubs or Organizations: Yes     Attends Club or Organization Meetings: More than 4 times per year     Marital Status:    Interpersonal Safety: Unknown (10/17/2022)    Received from [facility]    Humiliation, Afraid, Rape, and Kick questionnaire     Fear of Current or Ex-Partner: No   Housing Stability: Low Risk  (10/17/2022)    Received from [facility]    Housing Stability Vital Sign     Unable to Pay for Housing in the Last Year: No     Number of Places Lived in the Last Year: 1     Unstable Housing in the Last Year: No       VITALS:  Patient Vitals for the past 24 hrs:   BP Temp Temp src Pulse Resp SpO2   10/17/24 0630 115/58 -- -- 72 25 95 %   10/17/24 0524 (!) 144/67 97.7  F (36.5  C) Oral 79 20 95 %       PHYSICAL EXAM    Constitutional:  Well developed, Well nourished,  HENT:  Normocephalic, Atraumatic, Bilateral external ears normal, Oropharynx moist, Nose normal.   Neck:  Normal range of motion, No meningismus, No stridor.   Eyes:  EOMI, Conjunctiva normal, No discharge.   Respiratory:  Normal breath sounds, No respiratory distress, No wheezing, No chest tenderness.   Cardiovascular:  Normal heart rate, Normal rhythm, No murmurs  GI:  Soft, No tenderness, No guarding,   Musculoskeletal:  Neurovascularly intact distally, No edema, No tenderness, No cyanosis, Good range of motion in all major joints.   Integument:  Warm, Dry, No erythema, No rash.   Lymphatic:  No lymphadenopathy noted.   Neurologic:  Alert & oriented , Normal motor function,  No focal deficits noted.   Psychiatric:  Affect normal, Judgment normal, Mood normal.      LAB:  All pertinent  labs reviewed and interpreted.  Results for orders placed or performed during the hospital encounter of 10/17/24   Result Value Ref Range    INR 1.09 0.85 - 1.15   Comprehensive metabolic panel   Result Value Ref Range    Sodium 131 (L) 135 - 145 mmol/L    Potassium 4.0 3.4 - 5.3 mmol/L    Carbon Dioxide (CO2) 21 (L) 22 - 29 mmol/L    Anion Gap 14 7 - 15 mmol/L    Urea Nitrogen 14.9 6.0 - 20.0 mg/dL    Creatinine 1.13 0.67 - 1.17 mg/dL    GFR Estimate 77 >60 mL/min/1.73m2    Calcium 9.5 8.8 - 10.4 mg/dL    Chloride 96 (L) 98 - 107 mmol/L    Glucose 199 (H) 70 - 99 mg/dL    Alkaline Phosphatase 211 (H) 40 - 150 U/L    AST 29 0 - 45 U/L    ALT 44 0 - 70 U/L    Protein Total 7.8 6.4 - 8.3 g/dL    Albumin 3.8 3.5 - 5.2 g/dL    Bilirubin Total 0.9 <=1.2 mg/dL   Result Value Ref Range    Lipase 18 13 - 60 U/L   Lactic acid whole blood with 1x repeat in 2 hr when >2   Result Value Ref Range    Lactic Acid, Initial 2.3 (H) 0.7 - 2.0 mmol/L   Blood gas venous   Result Value Ref Range    pH Venous 7.41 7.32 - 7.43    pCO2 Venous 38 (L) 40 - 50 mm Hg    pO2 Venous 51 (H) 25 - 47 mm Hg    Bicarbonate Venous 24 21 - 28 mmol/L    Base Excess/Deficit Venous -0.6 -3.0 - 3.0 mmol/L    FIO2 21     Oxyhemoglobin Venous 85 (H) 70 - 75 %    O2 Sat, Venous 85.7 (H) 70.0 - 75.0 %   Result Value Ref Range    CRP Inflammation 167.00 (H) <5.00 mg/L   CBC with platelets and differential   Result Value Ref Range    WBC Count 10.3 4.0 - 11.0 10e3/uL    RBC Count 5.74 4.40 - 5.90 10e6/uL    Hemoglobin 16.8 13.3 - 17.7 g/dL    Hematocrit 49.1 40.0 - 53.0 %    MCV 86 78 - 100 fL    MCH 29.3 26.5 - 33.0 pg    MCHC 34.2 31.5 - 36.5 g/dL    RDW 12.3 10.0 - 15.0 %    Platelet Count 351 150 - 450 10e3/uL    % Neutrophils 63 %    % Lymphocytes 21 %    % Monocytes 14 %    % Eosinophils 1 %    % Basophils 1 %    % Immature Granulocytes 0 %    NRBCs per 100 WBC 0 <1 /100    Absolute Neutrophils 6.5 1.6 - 8.3 10e3/uL    Absolute Lymphocytes 2.2 0.8 - 5.3  10e3/uL    Absolute Monocytes 1.4 (H) 0.0 - 1.3 10e3/uL    Absolute Eosinophils 0.1 0.0 - 0.7 10e3/uL    Absolute Basophils 0.1 0.0 - 0.2 10e3/uL    Absolute Immature Granulocytes 0.0 <=0.4 10e3/uL    Absolute NRBCs 0.0 10e3/uL   ECG 12-LEAD WITH MUSE (LHE)   Result Value Ref Range    Systolic Blood Pressure 144 mmHg    Diastolic Blood Pressure 67 mmHg    Ventricular Rate 82 BPM    Atrial Rate 82 BPM    CT Interval 126 ms    QRS Duration 136 ms     ms    QTc 439 ms    P Axis 50 degrees    R AXIS 59 degrees    T Axis 38 degrees    Interpretation ECG       Sinus rhythm  Right bundle branch block  Abnormal ECG  When compared with ECG of 2024 04:35,  No significant change was found  Confirmed by SEE ED PROVIDER NOTE FOR, ECG INTERPRETATION ([id number]),  PREMA WALTER ([id number]) on 10/17/2024 5:48:01 AM         RADIOLOGY:    CT Abdomen Pelvis w Contrast    (Results Pending)       EK-rate is 82, sinus, there is no ST segment elevation or depression appreciated right bundle branch block, EKG is unchanged from 2024  I have independently reviewed and interpreted this EKG          Fariba Bales MD  Emergency Medicine  [facility] EMERGENCY ROOM  [address] [id number] [phone]  Dept: [phone]       Fariba Bales MD  10/17/24 0755

## 2024-10-17 NOTE — ED NOTES
Patient unable to find morphine at any pharmacy in a large mile radius around [place].  Each pharmacy is saying that they do not have it in stock in any milligram amounts.    I was agreeable with sending in a couple of tabs of oxycodone.    SHANDA Gonzalez Amanda, PA-C  10/17/24 5806     Follow up with your surgeon in two weeks. Call for appointment.  If you need more pain medication, call your surgeon's office. For medication refills or authorizations, please call [phone]  We recommend that you call and schedule a follow up appointment within 2-4 weeks with your primary care physician for repeat blood work (CBC and BMP) for post hospital discharge follow-up care.  Call your surgeon if you have increased redness/pain/drainage or fever. Return to ER for shortness of breath/calf tenderness.

## 2024-10-17 NOTE — ED NOTES
EMERGENCY DEPARTMENT SIGN OUT NOTE        ED COURSE AND MEDICAL DECISION MAKING  Patient was signed out to me by Dr Fariba Bales at 7:28 AM     In brief, Jonna Dumont is a 54 year old male who initially presented for diarrhea, nausea and abdominal pain.      At time of sign out, disposition was pending CT scan, fluids and repeat lactate.     He feels the fluids helped a lot, and his lactic acid is significantly improved.  He does still feel he needs more iv fluids.  Plan for 1 additional half liter bolus, and then will discharge home for ongoing home management.  Was going to do pedialyte but that order cancelled due to lack of stock, and patient instead drinking water/juice a bit.  He does also have some abd cramping so I am ordering pain med.  He notes he will have his wife pick him up but does feel he needs a pain med for the cramping and he cannot get nsaids.  His ct reviewed and shows his chronic liver/gallbladder findings that sound like if anything improved from previous and as today's issue is primarily diarrhea with lft's not showing obstructive signs, this does seem c/w a chronic finding, alk phos chronically elevated.  His ct shows the thickened bowel.  We discussed his ulcerative colitis history, what he does for flares, and whom he manages with.  He goes to [facility], when gets a flare typically due to dietery indiscretion and he just tries to do fluids/bowel rest and if that doesn't help, then he does a course of steroids which he has at home as prescribed by his gi doctors.  I advised he start the steroids and message them that he had imaging here today for them to review and let them know he will be starting the steroids.  He is comfortable with that plan.  Discharged home for ongoing outpatient maangement.    FINAL IMPRESSION    1. Diarrhea, unspecified type    2. Mild dehydration        ED MEDS  Medications   sodium chloride 0.9% BOLUS 1,000 mL (0 mLs Intravenous Stopped 10/17/24 0719)    iopamidol (ISOVUE-370) solution 90 mL (90 mLs Intravenous $Given 10/17/24 0657)       LAB  Labs Ordered and Resulted from Time of ED Arrival to Time of ED Departure   COMPREHENSIVE METABOLIC PANEL - Abnormal       Result Value    Sodium 131 (*)     Potassium 4.0      Carbon Dioxide (CO2) 21 (*)     Anion Gap 14      Urea Nitrogen 14.9      Creatinine 1.13      GFR Estimate 77      Calcium 9.5      Chloride 96 (*)     Glucose 199 (*)     Alkaline Phosphatase 211 (*)     AST 29      ALT 44      Protein Total 7.8      Albumin 3.8      Bilirubin Total 0.9     LACTIC ACID WHOLE BLOOD WITH 1X REPEAT IN 2 HR WHEN >2 - Abnormal    Lactic Acid, Initial 2.3 (*)    BLOOD GAS VENOUS - Abnormal    pH Venous 7.41      pCO2 Venous 38 (*)     pO2 Venous 51 (*)     Bicarbonate Venous 24      Base Excess/Deficit Venous -0.6      FIO2 21      Oxyhemoglobin Venous 85 (*)     O2 Sat, Venous 85.7 (*)    CRP INFLAMMATION - Abnormal    CRP Inflammation 167.00 (*)    CBC WITH PLATELETS AND DIFFERENTIAL - Abnormal    WBC Count 10.3      RBC Count 5.74      Hemoglobin 16.8      Hematocrit 49.1      MCV 86      MCH 29.3      MCHC 34.2      RDW 12.3      Platelet Count 351      % Neutrophils 63      % Lymphocytes 21      % Monocytes 14      % Eosinophils 1      % Basophils 1      % Immature Granulocytes 0      NRBCs per 100 WBC 0      Absolute Neutrophils 6.5      Absolute Lymphocytes 2.2      Absolute Monocytes 1.4 (*)     Absolute Eosinophils 0.1      Absolute Basophils 0.1      Absolute Immature Granulocytes 0.0      Absolute NRBCs 0.0     INR - Normal    INR 1.09     LIPASE - Normal    Lipase 18     ROUTINE UA WITH MICROSCOPIC REFLEX TO CULTURE   LACTIC ACID WHOLE BLOOD       RADIOLOGY    CT Abdomen Pelvis w Contrast    (Results Pending)   CT Abdomen Pelvis w Contrast    Result Date: 10/17/2024  EXAM: CT ABDOMEN PELVIS W CONTRAST LOCATION: [facility] DATE: 10/17/2024 INDICATION: Abdominal pain. Hx ulcerative  colitis and Primary sclerosing cholangitis. COMPARISON: 7/7/2024. TECHNIQUE: CT scan of the abdomen and pelvis was performed following injection of IV contrast. Multiplanar reformats were obtained. Dose reduction techniques were used. CONTRAST: Isovue 370 90ml FINDINGS: LOWER CHEST: Nothing acute. HEPATOBILIARY: Mild gallbladder wall thickening. Cholelithiasis. Redemonstrated common bile duct wall thickening. Intrahepatic bile ducts appear mildly prominent. Irregular bile duct appearance better seen on prior MRI. Minimal pneumobilia. Stable lobulated liver contour. PANCREAS: Normal. SPLEEN: Normal. ADRENAL GLANDS: Normal. KIDNEYS/BLADDER: Normal. BOWEL: Postoperative changes of colectomy with ileoanal anastomosis. Beginning in the left lower quadrant, distal bowel is severely thickened and edematous with surrounding stranding (spans roughly 30 to 40 cm). Thickening extends through the ileoanal anastomosis. Bowel is decompressed just prior to beginning of wall thickening (cannot exclude narrowing at this location; series 3, image 129). Areas of mildly dilated pelvic small bowel, up to 4-5 cm. LYMPH NODES: Mild pelvic adenopathy, including adjacent to abnormal bowel, within example node measuring 14 x 20 mm (series 3, image 171). VASCULATURE: Nonaneurysmal aorta. Patent abdominal aortic branches, including the SMA. Patent draining mesenteric and portal veins. PELVIC ORGANS: Small amount of free pelvic fluid. MUSCULOSKELETAL: Severe disc space narrowing L5-S1.     IMPRESSION: 1.  Post colectomy. Severe distal bowel wall thickening along the left lower quadrant extending through the ileoanal anastomosis. Findings are nonspecific, though could relate to patient's history of inflammatory bowel disease. 2.  Areas of mildly dilated small bowel from ileus or partial obstruction. 3.  Small amount of free fluid. No free air. No abscess. 4.  Redemonstrated changes related to patient's history of primary sclerosing cholangitis.  Cholelithiasis. 5.  Mild adenopathy, likely reactive.       DISCHARGE MEDS  New Prescriptions    No medications on file       Ileana Jenkins MD   [facility] EMERGENCY ROOM  [address] [place] [id number] [phone]     Ileana Jenkins MD  10/17/24 2684

## 2024-10-17 NOTE — DISCHARGE INSTRUCTIONS
I would advise calling your GI doctors at [facility] to let them know about imaging here today that they can review, and about your symptoms of a flare up of your colitis.    Then, as you have previously discussed with them, I would start your steroid course for these flare ups since your bowels are swollen.    Return as needed if not able to stay hydrated, or if you begin running a fever, or pain is intolerable.      Continue Imodium as needed for diarrhea  Drink at least a half a gallon of water daily  Tylenol 650 mg every 4 hours as needed for pain, oxycodone for any worsened pain not helped by the tylenol.      Return to the emergency department for worsening problems or concerns

## 2024-10-17 NOTE — ED TRIAGE NOTES
Pt reporting lower ABD pain. Has been nauseated, not drinking or eating. Diarrhea has been going on for few days. ABD started few days ago. Pt has hx of colon cancer and Colitis; colon was removed.    Triage Assessment (Adult)       Row Name 10/17/24 0526          Triage Assessment    Airway WDL WDL        Respiratory WDL    Respiratory WDL WDL        Skin Circulation/Temperature WDL    Skin Circulation/Temperature WDL WDL        Cardiac WDL    Cardiac WDL WDL        Peripheral/Neurovascular WDL    Peripheral Neurovascular WDL WDL        Cognitive/Neuro/Behavioral WDL    Cognitive/Neuro/Behavioral WDL WDL

## 2024-10-17 NOTE — ED NOTES
Introduced self to patient.  Whiteboard updated.  Plan of care and length of time discussed with patient.  Will continue to monitor. Samantha Fraser RN.......10/17/2024 7:20 AM